# Patient Record
Sex: FEMALE | Race: WHITE | NOT HISPANIC OR LATINO | URBAN - METROPOLITAN AREA
[De-identification: names, ages, dates, MRNs, and addresses within clinical notes are randomized per-mention and may not be internally consistent; named-entity substitution may affect disease eponyms.]

---

## 2017-04-06 ENCOUNTER — EMERGENCY (EMERGENCY)
Facility: HOSPITAL | Age: 82
LOS: 1 days | Discharge: HOME | End: 2017-04-06
Attending: EMERGENCY MEDICINE
Payer: COMMERCIAL

## 2017-04-06 VITALS
DIASTOLIC BLOOD PRESSURE: 80 MMHG | HEART RATE: 70 BPM | RESPIRATION RATE: 20 BRPM | OXYGEN SATURATION: 100 % | SYSTOLIC BLOOD PRESSURE: 141 MMHG | TEMPERATURE: 99 F

## 2017-04-06 VITALS — HEIGHT: 62 IN | WEIGHT: 138.01 LBS

## 2017-04-06 DIAGNOSIS — M79.1 MYALGIA: ICD-10-CM

## 2017-04-06 DIAGNOSIS — Z98.49 CATARACT EXTRACTION STATUS, UNSPECIFIED EYE: Chronic | ICD-10-CM

## 2017-04-06 DIAGNOSIS — S62.102A FRACTURE OF UNSPECIFIED CARPAL BONE, LEFT WRIST, INITIAL ENCOUNTER FOR CLOSED FRACTURE: Chronic | ICD-10-CM

## 2017-04-06 DIAGNOSIS — Z98.89 OTHER SPECIFIED POSTPROCEDURAL STATES: Chronic | ICD-10-CM

## 2017-04-06 DIAGNOSIS — M54.5 LOW BACK PAIN: ICD-10-CM

## 2017-04-06 LAB
ALBUMIN SERPL ELPH-MCNC: 3.6 G/DL — SIGNIFICANT CHANGE UP (ref 3.5–5)
ALP SERPL-CCNC: 59 U/L — SIGNIFICANT CHANGE UP (ref 40–120)
ALT FLD-CCNC: 18 U/L DA — SIGNIFICANT CHANGE UP (ref 10–60)
ANION GAP SERPL CALC-SCNC: 12 MMOL/L — SIGNIFICANT CHANGE UP (ref 5–17)
AST SERPL-CCNC: 20 U/L — SIGNIFICANT CHANGE UP (ref 10–40)
BILIRUB SERPL-MCNC: 0.3 MG/DL — SIGNIFICANT CHANGE UP (ref 0.2–1.2)
BUN SERPL-MCNC: 19 MG/DL — HIGH (ref 7–18)
CALCIUM SERPL-MCNC: 8.5 MG/DL — SIGNIFICANT CHANGE UP (ref 8.4–10.5)
CHLORIDE SERPL-SCNC: 102 MMOL/L — SIGNIFICANT CHANGE UP (ref 96–108)
CO2 SERPL-SCNC: 26 MMOL/L — SIGNIFICANT CHANGE UP (ref 22–31)
CREAT SERPL-MCNC: 0.9 MG/DL — SIGNIFICANT CHANGE UP (ref 0.5–1.3)
GLUCOSE SERPL-MCNC: 75 MG/DL — SIGNIFICANT CHANGE UP (ref 70–99)
HCT VFR BLD CALC: 37.2 % — SIGNIFICANT CHANGE UP (ref 34.5–45)
HGB BLD-MCNC: 12.4 G/DL — SIGNIFICANT CHANGE UP (ref 11.5–15.5)
LIDOCAIN IGE QN: 174 U/L — SIGNIFICANT CHANGE UP (ref 73–393)
MCHC RBC-ENTMCNC: 30.6 PG — SIGNIFICANT CHANGE UP (ref 27–34)
MCHC RBC-ENTMCNC: 33.4 GM/DL — SIGNIFICANT CHANGE UP (ref 32–36)
MCV RBC AUTO: 91.6 FL — SIGNIFICANT CHANGE UP (ref 80–100)
PLATELET # BLD AUTO: 211 K/UL — SIGNIFICANT CHANGE UP (ref 150–400)
POTASSIUM SERPL-MCNC: 3.7 MMOL/L — SIGNIFICANT CHANGE UP (ref 3.5–5.3)
POTASSIUM SERPL-SCNC: 3.7 MMOL/L — SIGNIFICANT CHANGE UP (ref 3.5–5.3)
PROT SERPL-MCNC: 7.1 G/DL — SIGNIFICANT CHANGE UP (ref 6–8.3)
RBC # BLD: 4.06 M/UL — SIGNIFICANT CHANGE UP (ref 3.8–5.2)
RBC # FLD: 13.1 % — SIGNIFICANT CHANGE UP (ref 10.3–14.5)
SODIUM SERPL-SCNC: 140 MMOL/L — SIGNIFICANT CHANGE UP (ref 135–145)
TROPONIN I SERPL-MCNC: <0.015 NG/ML — SIGNIFICANT CHANGE UP (ref 0–0.04)
WBC # BLD: 9 K/UL — SIGNIFICANT CHANGE UP (ref 3.8–10.5)
WBC # FLD AUTO: 9 K/UL — SIGNIFICANT CHANGE UP (ref 3.8–10.5)

## 2017-04-06 PROCEDURE — 93005 ELECTROCARDIOGRAM TRACING: CPT

## 2017-04-06 PROCEDURE — 85027 COMPLETE CBC AUTOMATED: CPT

## 2017-04-06 PROCEDURE — 96374 THER/PROPH/DIAG INJ IV PUSH: CPT

## 2017-04-06 PROCEDURE — 99284 EMERGENCY DEPT VISIT MOD MDM: CPT

## 2017-04-06 PROCEDURE — 71046 X-RAY EXAM CHEST 2 VIEWS: CPT

## 2017-04-06 PROCEDURE — 99284 EMERGENCY DEPT VISIT MOD MDM: CPT | Mod: 25

## 2017-04-06 PROCEDURE — 71020: CPT | Mod: 26

## 2017-04-06 PROCEDURE — 84484 ASSAY OF TROPONIN QUANT: CPT

## 2017-04-06 PROCEDURE — 80053 COMPREHEN METABOLIC PANEL: CPT

## 2017-04-06 PROCEDURE — 83690 ASSAY OF LIPASE: CPT

## 2017-04-06 RX ORDER — KETOROLAC TROMETHAMINE 30 MG/ML
15 SYRINGE (ML) INJECTION ONCE
Qty: 0 | Refills: 0 | Status: DISCONTINUED | OUTPATIENT
Start: 2017-04-06 | End: 2017-04-06

## 2017-04-06 RX ADMIN — Medication 15 MILLIGRAM(S): at 21:52

## 2017-04-06 RX ADMIN — Medication 15 MILLIGRAM(S): at 18:20

## 2017-04-06 NOTE — ED PROVIDER NOTE - CONDUCTED A DETAILED DISCUSSION WITH PATIENT AND/OR GUARDIAN REGARDING, MDM
need for outpatient follow-up/return to ED if symptoms worsen, persist or questions arise need for outpatient follow-up/lab results/radiology results/return to ED if symptoms worsen, persist or questions arise

## 2017-04-06 NOTE — ED PROVIDER NOTE - PMH
AAA (abdominal aortic aneurysm)  stable  Breast CA    Chronic obstructive pulmonary disease    Hyperlipidemia    Hypertension    Nicotine dependence    Osteoporosis    Seasonal allergies    TB (tuberculosis)  as a child

## 2017-04-06 NOTE — ED ADULT NURSE NOTE - OBJECTIVE STATEMENT
stated she had back pain yesterday radiating to l side of thorax area. denies any pain at this time. ekg done. made comfortable.

## 2017-04-06 NOTE — ED PROVIDER NOTE - PSH
H/O left breast biopsy  6/2014  History of cataract extraction  bilateral  Left wrist fracture  s/p ORIF dec 2013  Port-a-cath in place  [place] 8/4/2014

## 2017-04-06 NOTE — ED PROVIDER NOTE - MEDICAL DECISION MAKING DETAILS
Plan to check blood work, get CXR, repeat cardiogram. If normal, given Hx, will advise admission for stress test and cardiac monitoring. Pt with h/o aneurysm, smoker, here with 2 days of bilateral low back pain after physical exertion. Normal exam. labs reassuring. Ecg reassuring. CXR wnl. Pt feels better after analgesia. Wishes to be dc'ed and plans to follow up with pmd. Daughter accompanies pt. Printed copy of results. Dc with outpt follow up.

## 2017-04-06 NOTE — ED PROVIDER NOTE - NS ED MD SCRIBE ATTENDING SCRIBE SECTIONS
HISTORY OF PRESENT ILLNESS/VITAL SIGNS( Pullset)/DISPOSITION/PAST MEDICAL/SURGICAL/SOCIAL HISTORY/HIV/REVIEW OF SYSTEMS/PHYSICAL EXAM

## 2017-04-06 NOTE — ED PROVIDER NOTE - OBJECTIVE STATEMENT
80 y/o female with PMHx of Aortic Aneurysm (size of 6cmx3.1cm), HTN, and HLD presents to the ED c/o lower back pain x yesterday. Pt notes that she took ASA to mild relief in Sx. Pt is a current everyday smoker. Pt states that she had stress test done > year ago. Pt denies abd pain, trouble breathing, nausea, or any other complaints. Allergies: Lisinopril (Angioedema). PMD: Dr. Shayy Jones. 80 y/o female with PMHx of Aortic Aneurysm (size of 6cmx3.1cm), HTN, and HLD presents to the ED c/o lower back pain, bilateral x yesterday. She reports moving furniture around the day before onset of sx. Pt notes that she took ASA to mild relief in Sx. Pt is a current everyday smoker. Pt denies abd pain, trouble breathing, nausea, or any other complaints. Allergies: Lisinopril (Angioedema). PMD: Dr. Shayy Jones.

## 2017-04-10 DIAGNOSIS — Z86.11 PERSONAL HISTORY OF TUBERCULOSIS: ICD-10-CM

## 2017-04-10 DIAGNOSIS — F17.210 NICOTINE DEPENDENCE, CIGARETTES, UNCOMPLICATED: ICD-10-CM

## 2017-04-10 DIAGNOSIS — J44.9 CHRONIC OBSTRUCTIVE PULMONARY DISEASE, UNSPECIFIED: ICD-10-CM

## 2017-04-10 DIAGNOSIS — Z85.3 PERSONAL HISTORY OF MALIGNANT NEOPLASM OF BREAST: ICD-10-CM

## 2017-04-10 DIAGNOSIS — E78.5 HYPERLIPIDEMIA, UNSPECIFIED: ICD-10-CM

## 2017-04-10 DIAGNOSIS — I10 ESSENTIAL (PRIMARY) HYPERTENSION: ICD-10-CM

## 2017-04-10 DIAGNOSIS — M81.0 AGE-RELATED OSTEOPOROSIS WITHOUT CURRENT PATHOLOGICAL FRACTURE: ICD-10-CM

## 2018-02-02 ENCOUNTER — INPATIENT (INPATIENT)
Facility: HOSPITAL | Age: 83
LOS: 0 days | Discharge: HOME | DRG: 313 | End: 2018-02-03
Attending: HOSPITALIST | Admitting: HOSPITALIST
Payer: COMMERCIAL

## 2018-02-02 VITALS
SYSTOLIC BLOOD PRESSURE: 190 MMHG | OXYGEN SATURATION: 98 % | DIASTOLIC BLOOD PRESSURE: 80 MMHG | RESPIRATION RATE: 18 BRPM | HEIGHT: 62 IN | TEMPERATURE: 98 F | HEART RATE: 81 BPM | WEIGHT: 130.07 LBS

## 2018-02-02 DIAGNOSIS — S62.102A FRACTURE OF UNSPECIFIED CARPAL BONE, LEFT WRIST, INITIAL ENCOUNTER FOR CLOSED FRACTURE: Chronic | ICD-10-CM

## 2018-02-02 DIAGNOSIS — F17.200 NICOTINE DEPENDENCE, UNSPECIFIED, UNCOMPLICATED: ICD-10-CM

## 2018-02-02 DIAGNOSIS — R07.9 CHEST PAIN, UNSPECIFIED: ICD-10-CM

## 2018-02-02 DIAGNOSIS — Z98.89 OTHER SPECIFIED POSTPROCEDURAL STATES: Chronic | ICD-10-CM

## 2018-02-02 DIAGNOSIS — J44.9 CHRONIC OBSTRUCTIVE PULMONARY DISEASE, UNSPECIFIED: ICD-10-CM

## 2018-02-02 DIAGNOSIS — Z29.9 ENCOUNTER FOR PROPHYLACTIC MEASURES, UNSPECIFIED: ICD-10-CM

## 2018-02-02 DIAGNOSIS — Z98.49 CATARACT EXTRACTION STATUS, UNSPECIFIED EYE: Chronic | ICD-10-CM

## 2018-02-02 DIAGNOSIS — I10 ESSENTIAL (PRIMARY) HYPERTENSION: ICD-10-CM

## 2018-02-02 DIAGNOSIS — C50.919 MALIGNANT NEOPLASM OF UNSPECIFIED SITE OF UNSPECIFIED FEMALE BREAST: ICD-10-CM

## 2018-02-02 LAB
ALBUMIN SERPL ELPH-MCNC: 3.8 G/DL — SIGNIFICANT CHANGE UP (ref 3.5–5)
ALP SERPL-CCNC: 61 U/L — SIGNIFICANT CHANGE UP (ref 40–120)
ALT FLD-CCNC: 23 U/L DA — SIGNIFICANT CHANGE UP (ref 10–60)
ANION GAP SERPL CALC-SCNC: 9 MMOL/L — SIGNIFICANT CHANGE UP (ref 5–17)
APTT BLD: 34.3 SEC — SIGNIFICANT CHANGE UP (ref 27.5–37.4)
AST SERPL-CCNC: 22 U/L — SIGNIFICANT CHANGE UP (ref 10–40)
BASOPHILS # BLD AUTO: 0.1 K/UL — SIGNIFICANT CHANGE UP (ref 0–0.2)
BASOPHILS NFR BLD AUTO: 0.5 % — SIGNIFICANT CHANGE UP (ref 0–2)
BILIRUB SERPL-MCNC: 0.4 MG/DL — SIGNIFICANT CHANGE UP (ref 0.2–1.2)
BUN SERPL-MCNC: 17 MG/DL — SIGNIFICANT CHANGE UP (ref 7–18)
CALCIUM SERPL-MCNC: 9.2 MG/DL — SIGNIFICANT CHANGE UP (ref 8.4–10.5)
CHLORIDE SERPL-SCNC: 104 MMOL/L — SIGNIFICANT CHANGE UP (ref 96–108)
CK MB BLD-MCNC: 1.4 % — SIGNIFICANT CHANGE UP (ref 0–3.5)
CK MB CFR SERPL CALC: 1.7 NG/ML — SIGNIFICANT CHANGE UP (ref 0–3.6)
CK SERPL-CCNC: 118 U/L — SIGNIFICANT CHANGE UP (ref 21–215)
CO2 SERPL-SCNC: 28 MMOL/L — SIGNIFICANT CHANGE UP (ref 22–31)
CREAT SERPL-MCNC: 0.84 MG/DL — SIGNIFICANT CHANGE UP (ref 0.5–1.3)
EOSINOPHIL # BLD AUTO: 0 K/UL — SIGNIFICANT CHANGE UP (ref 0–0.5)
EOSINOPHIL NFR BLD AUTO: 0.3 % — SIGNIFICANT CHANGE UP (ref 0–6)
GLUCOSE SERPL-MCNC: 106 MG/DL — HIGH (ref 70–99)
HCT VFR BLD CALC: 40 % — SIGNIFICANT CHANGE UP (ref 34.5–45)
HGB BLD-MCNC: 12.9 G/DL — SIGNIFICANT CHANGE UP (ref 11.5–15.5)
INR BLD: 0.98 RATIO — SIGNIFICANT CHANGE UP (ref 0.88–1.16)
LYMPHOCYTES # BLD AUTO: 1.9 K/UL — SIGNIFICANT CHANGE UP (ref 1–3.3)
LYMPHOCYTES # BLD AUTO: 18.8 % — SIGNIFICANT CHANGE UP (ref 13–44)
MCHC RBC-ENTMCNC: 30.9 PG — SIGNIFICANT CHANGE UP (ref 27–34)
MCHC RBC-ENTMCNC: 32.3 GM/DL — SIGNIFICANT CHANGE UP (ref 32–36)
MCV RBC AUTO: 95.8 FL — SIGNIFICANT CHANGE UP (ref 80–100)
MONOCYTES # BLD AUTO: 0.4 K/UL — SIGNIFICANT CHANGE UP (ref 0–0.9)
MONOCYTES NFR BLD AUTO: 4.3 % — SIGNIFICANT CHANGE UP (ref 2–14)
NEUTROPHILS # BLD AUTO: 7.9 K/UL — HIGH (ref 1.8–7.4)
NEUTROPHILS NFR BLD AUTO: 76 % — SIGNIFICANT CHANGE UP (ref 43–77)
PLATELET # BLD AUTO: 220 K/UL — SIGNIFICANT CHANGE UP (ref 150–400)
POTASSIUM SERPL-MCNC: 4.5 MMOL/L — SIGNIFICANT CHANGE UP (ref 3.5–5.3)
POTASSIUM SERPL-SCNC: 4.5 MMOL/L — SIGNIFICANT CHANGE UP (ref 3.5–5.3)
PROT SERPL-MCNC: 7.5 G/DL — SIGNIFICANT CHANGE UP (ref 6–8.3)
PROTHROM AB SERPL-ACNC: 10.7 SEC — SIGNIFICANT CHANGE UP (ref 9.8–12.7)
RBC # BLD: 4.18 M/UL — SIGNIFICANT CHANGE UP (ref 3.8–5.2)
RBC # FLD: 14 % — SIGNIFICANT CHANGE UP (ref 10.3–14.5)
SODIUM SERPL-SCNC: 141 MMOL/L — SIGNIFICANT CHANGE UP (ref 135–145)
TROPONIN I SERPL-MCNC: <0.015 NG/ML — SIGNIFICANT CHANGE UP (ref 0–0.04)
TROPONIN I SERPL-MCNC: <0.015 NG/ML — SIGNIFICANT CHANGE UP (ref 0–0.04)
WBC # BLD: 10.3 K/UL — SIGNIFICANT CHANGE UP (ref 3.8–10.5)
WBC # FLD AUTO: 10.3 K/UL — SIGNIFICANT CHANGE UP (ref 3.8–10.5)

## 2018-02-02 PROCEDURE — 99223 1ST HOSP IP/OBS HIGH 75: CPT | Mod: GC

## 2018-02-02 PROCEDURE — 71045 X-RAY EXAM CHEST 1 VIEW: CPT | Mod: 26

## 2018-02-02 PROCEDURE — 71275 CT ANGIOGRAPHY CHEST: CPT | Mod: 26

## 2018-02-02 PROCEDURE — 99285 EMERGENCY DEPT VISIT HI MDM: CPT | Mod: 25

## 2018-02-02 RX ORDER — NICOTINE POLACRILEX 2 MG
1 GUM BUCCAL DAILY
Qty: 0 | Refills: 0 | Status: DISCONTINUED | OUTPATIENT
Start: 2018-02-02 | End: 2018-02-03

## 2018-02-02 RX ORDER — ATORVASTATIN CALCIUM 80 MG/1
40 TABLET, FILM COATED ORAL AT BEDTIME
Qty: 0 | Refills: 0 | Status: DISCONTINUED | OUTPATIENT
Start: 2018-02-02 | End: 2018-02-03

## 2018-02-02 RX ORDER — ASPIRIN/CALCIUM CARB/MAGNESIUM 324 MG
1 TABLET ORAL
Qty: 0 | Refills: 0 | COMMUNITY

## 2018-02-02 RX ORDER — FERROUS SULFATE 325(65) MG
1 TABLET ORAL
Qty: 0 | Refills: 0 | COMMUNITY

## 2018-02-02 RX ORDER — ENOXAPARIN SODIUM 100 MG/ML
40 INJECTION SUBCUTANEOUS DAILY
Qty: 0 | Refills: 0 | Status: DISCONTINUED | OUTPATIENT
Start: 2018-02-02 | End: 2018-02-03

## 2018-02-02 RX ORDER — ASPIRIN/CALCIUM CARB/MAGNESIUM 324 MG
81 TABLET ORAL DAILY
Qty: 0 | Refills: 0 | Status: DISCONTINUED | OUTPATIENT
Start: 2018-02-02 | End: 2018-02-03

## 2018-02-02 RX ORDER — ASPIRIN/CALCIUM CARB/MAGNESIUM 324 MG
325 TABLET ORAL ONCE
Qty: 0 | Refills: 0 | Status: DISCONTINUED | OUTPATIENT
Start: 2018-02-02 | End: 2018-02-02

## 2018-02-02 RX ORDER — SODIUM CHLORIDE 9 MG/ML
3 INJECTION INTRAMUSCULAR; INTRAVENOUS; SUBCUTANEOUS ONCE
Qty: 0 | Refills: 0 | Status: COMPLETED | OUTPATIENT
Start: 2018-02-02 | End: 2018-02-02

## 2018-02-02 RX ORDER — UBIDECARENONE 100 MG
200 CAPSULE ORAL
Qty: 0 | Refills: 0 | COMMUNITY

## 2018-02-02 RX ORDER — SIMVASTATIN 20 MG/1
1 TABLET, FILM COATED ORAL
Qty: 0 | Refills: 0 | COMMUNITY

## 2018-02-02 RX ORDER — NEBIVOLOL HYDROCHLORIDE 5 MG/1
1 TABLET ORAL
Qty: 0 | Refills: 0 | COMMUNITY

## 2018-02-02 RX ORDER — CHOLECALCIFEROL (VITAMIN D3) 125 MCG
1 CAPSULE ORAL
Qty: 0 | Refills: 0 | COMMUNITY

## 2018-02-02 RX ORDER — NEBIVOLOL HYDROCHLORIDE 5 MG/1
5 TABLET ORAL DAILY
Qty: 0 | Refills: 0 | Status: DISCONTINUED | OUTPATIENT
Start: 2018-02-02 | End: 2018-02-03

## 2018-02-02 RX ORDER — HYDROCHLOROTHIAZIDE 25 MG
12.5 TABLET ORAL DAILY
Qty: 0 | Refills: 0 | Status: DISCONTINUED | OUTPATIENT
Start: 2018-02-02 | End: 2018-02-03

## 2018-02-02 RX ADMIN — Medication 12.5 MILLIGRAM(S): at 16:11

## 2018-02-02 RX ADMIN — ATORVASTATIN CALCIUM 40 MILLIGRAM(S): 80 TABLET, FILM COATED ORAL at 22:07

## 2018-02-02 RX ADMIN — NEBIVOLOL HYDROCHLORIDE 5 MILLIGRAM(S): 5 TABLET ORAL at 16:12

## 2018-02-02 RX ADMIN — SODIUM CHLORIDE 3 MILLILITER(S): 9 INJECTION INTRAMUSCULAR; INTRAVENOUS; SUBCUTANEOUS at 08:37

## 2018-02-02 NOTE — ED PROVIDER NOTE - MEDICAL DECISION MAKING DETAILS
Pt w/ above hx of chest pain now resolved. Will obtain cardiac workup vs eval for PE. Otherwise well appearing. Will give ASA and reassess. Pt w/ above hx of chest pain now resolved. Will obtain cardiac workup vs eval for PE. Otherwise well appearing. work up reviewed, chest pain free, will admit for further ACS eval Pt w/ above hx of chest pain now resolved. Will obtain cardiac workup vs eval for PE (given breast cancer hx, worse w inspiration). Otherwise well appearing. work up reviewed, chest pain free, will admit for further ACS eval

## 2018-02-02 NOTE — H&P ADULT - PROBLEM SELECTOR PLAN 1
will admit the patient for chest pain   given the risk factors { active smoker , age >65 , HTN , hld }  BARON score -3-4   will trend cardiac enzymes { t1 -negative }  admit to telemetry   c/w aspirin , statin and b-blocker  follow with echo and will need { stress test as intermediate risk } baron score of 3 will admit the patient for chest pain   given the risk factors { active smoker , age >65 , HTN , hyperlipidemia  BARON score -3-4   will trend cardiac enzymes { t1 -negative }  admit to telemetry   c/w aspirin , statin and b-blocker  follow with echo and will need { stress test as intermediate risk } baron score of 3

## 2018-02-02 NOTE — H&P ADULT - ASSESSMENT
80 y/o female , lives with daughter , independent at baseline  with PMHx of Aortic Aneurysm (size of 6cmx3.1cm), HTN,copd { never used inhaler and not on home o2 and never intubated } and HLD presents to the ED c/o chest pain , sharp , moderate in intensity and lasted for 45 min and aggravated with deep breathing and she never experienced similar episode before and non -radiating .    will admit the patient for chest pain   given the risk factors { active smoker , age >65 , HTN , hld }  BARON score -3-4   will trend cardiac enzymes   admit to telemetry   c/w aspirin , statin and b-blocker .    elevated b.p  -190/80  as patient did not take her home blood pressure medications today

## 2018-02-02 NOTE — H&P ADULT - HISTORY OF PRESENT ILLNESS
82 y/o female , lives with daughter , independent at baseline  with PMHx of Aortic Aneurysm (size of 6cmx3.1cm), HTN, and HLD presents to the ED c/o chest pain , sharp , moderate in intensity and lasted for 45 min and aggravated with deep breathing and she never experienced similar episode before and non -radiating . patient denies any  palpitations, shortness of breath with exertion ,leg swelling , nausea , vomiting , abdominal  pain or urinary urgency , dysuria or any other acute complaints . patient has mastectomy {left side done 3 years ago for ductal carcinoma breast } and follows with serial mammograms every year and ultrasound screening every 6 months for abdominal aneurysm and as per patient no increase in diameter .      IN the E.D patient vitals are b.p  190/80, hr-81 ,temp -98 and rr-18  and spo2- 98  and EKG -NSR  .

## 2018-02-02 NOTE — H&P ADULT - NSHPLABSRESULTS_GEN_ALL_CORE
T(C): 36.7 (02 Feb 2018 06:59), Max: 36.7 (02 Feb 2018 06:59)  T(F): 98 (02 Feb 2018 06:59), Max: 98 (02 Feb 2018 06:59)  HR: 81 (02 Feb 2018 06:59) (81 - 81)  BP: 190/80 (02 Feb 2018 06:59) (190/80 - 190/80)  BP(mean): --  RR: 18 (02 Feb 2018 06:59) (18 - 18)  SpO2: 98% (02 Feb 2018 06:59) (98% - 98%)        LABS:                        12.9   10.3  )-----------( 220      ( 02 Feb 2018 08:38 )             40.0     02-02    141  |  104  |  17  ----------------------------<  106<H>  4.5   |  28  |  0.84    Ca    9.2      02 Feb 2018 08:38    TPro  7.5  /  Alb  3.8  /  TBili  0.4  /  DBili  x   /  AST  22  /  ALT  23  /  AlkPhos  61  02-02    PT/INR - ( 02 Feb 2018 08:38 )   PT: 10.7 sec;   INR: 0.98 ratio         PTT - ( 02 Feb 2018 08:38 )  PTT:34.3 sec    CAPILLARY BLOOD GLUCOSE          RADIOLOGY & ADDITIONAL TESTS:    Imaging Personally Reviewed: CTA :No pulmonary embolism.    Emphysema with unchanged left lower lobe subpleural 3 mm nodule since   2015.    Biapical opacity is unchanged from the prior study and may represent   scarring.

## 2018-02-02 NOTE — H&P ADULT - PROBLEM SELECTOR PLAN 4
-c/w duonebs q 6 hours   patient takes no inhaler at home   smoking cessation counselling provided and will need PFTS  as out-patient

## 2018-02-02 NOTE — H&P ADULT - NSHPPHYSICALEXAM_GEN_ALL_CORE
PHYSICAL EXAM:  GENERAL: NAD, well-groomed, well-developed  HEAD:  Atraumatic, Normocephalic  EYES: EOMI, PERRLA, conjunctiva and sclera clear  NECK: Supple, No JVD, Normal thyroid  CHEST/LUNG: Clear to percussion bilaterally; No rales, rhonchi, wheezing, or rubs  HEART: Regular rate and rhythm; No murmurs, rubs, or gallops  ABDOMEN: Soft, Nontender, Nondistended; Bowel sounds present  NERVOUS SYSTEM:  Alert & Oriented X3, Good concentration; Motor Strength 5/5 B/L   EXTREMITIES:  2+ Peripheral Pulses, No clubbing, cyanosis, or edema  SKIN; intact

## 2018-02-02 NOTE — ED PROVIDER NOTE - OBJECTIVE STATEMENT
83 y/o F pt w/ hx of AAA, breast CA, HLD, HTN w/ chest pain since 6Am this morning, Pain is sharp  non radiating and lasted for 1 hour. Pt is s/p L mastectomy. Denies leg selling. prior cardiac hx, nc NKDA. 83 y/o F pt w/ hx of AAA, breast CA, HLD, HTN w/ chest pain since 6Am this morning, Pain is sharp  non radiating and lasted for 1 hour. Pt is s/p L mastectomy. Denies leg selling. prior cardiac hx, nc NKDA. Pain left side of chest, no assoc diaphoresis, nausea. stated worsened when took a deep breath.

## 2018-02-02 NOTE — H&P ADULT - FAMILY HISTORY
Father  Still living? Unknown  Family history of diabetes mellitus (DM), Age at diagnosis: Age Unknown  Family history of heart disease, Age at diagnosis: Age Unknown     Mother  Still living? Unknown  Family history of diabetes mellitus (DM), Age at diagnosis: Age Unknown  Family history of heart disease, Age at diagnosis: Age Unknown     Sibling  Still living? Unknown  Family history of diabetes mellitus (DM), Age at diagnosis: Age Unknown  Family history of heart disease, Age at diagnosis: Age Unknown

## 2018-02-02 NOTE — H&P ADULT - ATTENDING COMMENTS
Patient was seen and examined by myself. Case was discussed with house staff in details. I have reviewed and agree with the plan as outlined above with edits where appropriate.  Atypical chest pain and uncontrolled HTN  - monitor on telemetry  - serial cardiac enzymes  - BP control and close monitoring  Other plan as above

## 2018-02-02 NOTE — H&P ADULT - PROBLEM SELECTOR PLAN 2
-will c/w hydrochlorothiazide 12.5 mg , bystolic  5mg   dash diet   monitor b.p  closely -will c/w hydrochlorothiazide 12.5 mg , Bystolic  5mg   dash diet   monitor b.p  closely

## 2018-02-02 NOTE — H&P ADULT - PROBLEM SELECTOR PLAN 5
-c/w nicotine patch 14 mg daily   -patient understands the risk after counselling and is not ready to enroll in smoking cessation program at the moment

## 2018-02-03 ENCOUNTER — TRANSCRIPTION ENCOUNTER (OUTPATIENT)
Age: 83
End: 2018-02-03

## 2018-02-03 VITALS
RESPIRATION RATE: 17 BRPM | SYSTOLIC BLOOD PRESSURE: 148 MMHG | OXYGEN SATURATION: 100 % | TEMPERATURE: 99 F | DIASTOLIC BLOOD PRESSURE: 86 MMHG | HEART RATE: 64 BPM

## 2018-02-03 LAB
CK MB BLD-MCNC: 1.3 % — SIGNIFICANT CHANGE UP (ref 0–3.5)
CK MB CFR SERPL CALC: 1.6 NG/ML — SIGNIFICANT CHANGE UP (ref 0–3.6)
CK SERPL-CCNC: 126 U/L — SIGNIFICANT CHANGE UP (ref 21–215)
TROPONIN I SERPL-MCNC: <0.015 NG/ML — SIGNIFICANT CHANGE UP (ref 0–0.04)

## 2018-02-03 PROCEDURE — 71045 X-RAY EXAM CHEST 1 VIEW: CPT

## 2018-02-03 PROCEDURE — 99239 HOSP IP/OBS DSCHRG MGMT >30: CPT

## 2018-02-03 PROCEDURE — 84484 ASSAY OF TROPONIN QUANT: CPT

## 2018-02-03 PROCEDURE — 85730 THROMBOPLASTIN TIME PARTIAL: CPT

## 2018-02-03 PROCEDURE — 82553 CREATINE MB FRACTION: CPT

## 2018-02-03 PROCEDURE — 82550 ASSAY OF CK (CPK): CPT

## 2018-02-03 PROCEDURE — 85027 COMPLETE CBC AUTOMATED: CPT

## 2018-02-03 PROCEDURE — 99285 EMERGENCY DEPT VISIT HI MDM: CPT | Mod: 25

## 2018-02-03 PROCEDURE — 85610 PROTHROMBIN TIME: CPT

## 2018-02-03 PROCEDURE — 85379 FIBRIN DEGRADATION QUANT: CPT

## 2018-02-03 PROCEDURE — 80053 COMPREHEN METABOLIC PANEL: CPT

## 2018-02-03 PROCEDURE — 71275 CT ANGIOGRAPHY CHEST: CPT

## 2018-02-03 PROCEDURE — 93005 ELECTROCARDIOGRAM TRACING: CPT

## 2018-02-03 RX ADMIN — Medication 81 MILLIGRAM(S): at 11:00

## 2018-02-03 RX ADMIN — Medication 12.5 MILLIGRAM(S): at 05:33

## 2018-02-03 RX ADMIN — NEBIVOLOL HYDROCHLORIDE 5 MILLIGRAM(S): 5 TABLET ORAL at 05:33

## 2018-02-03 NOTE — PROGRESS NOTE ADULT - ATTENDING COMMENTS
DIPO- stable for discharge home with plan for outpatient stress test  OUTPATIENT PCP follow up in 1 week

## 2018-02-03 NOTE — DISCHARGE NOTE ADULT - HOSPITAL COURSE
HPI: 82 y/o female , lives with daughter , independent at baseline  with PMHx of Aortic Aneurysm (size of 6cmx3.1cm), HTN, and HLD presents to the ED c/o chest pain , sharp , moderate in intensity and lasted for 45 min and aggravated with deep breathing and she never experienced similar episode before and non -radiating . patient denies any  palpitations, shortness of breath with exertion ,leg swelling , nausea , vomiting , abdominal  pain or urinary urgency , dysuria or any other acute complaints . patient has mastectomy {left side done 3 years ago for ductal carcinoma breast } and follows with serial mammograms every year and ultrasound screening every 6 months for abdominal aneurysm and as per patient no increase in diameter .    Pt was admitted for Chest pain given the risk factors { active smoker , age >65 , HTN , hyperlipidemia. BARON score was 3-4. Cardiac enzymes were negative x 3. Pt stayed asymptomatic on tele for less than 24 hours. Keep taking aspirin , statin and b-blocker @ home dose. Echo was not performed as patient wanted to go home.     Pt also has Hypertension.  Keep taking hydrochlorothiazide 12.5 mg , Bystolic  5mg. Pt also has Breast CA. S/p chemo-therapy and mastectomy. For COPD keep taking duonebs as needed. Smoking cessation counselling provided and will need PFTS  as out-patient. PT has Tobacco dependence.  Keep taking Nicotine patch 14 mg daily. Patient understands the risk after counselling and is not ready to enroll in smoking cessation program at the moment.    Pt is stable for discharge as per Dr Garcia HPI: 81 y/o female , lives with daughter , independent at baseline  with PMHx of Aortic Aneurysm (size of 6cmx3.1cm), HTN, and HLD presents to the ED c/o chest pain , sharp , moderate in intensity and lasted for 45 min and aggravated with deep breathing and she never experienced similar episode before and non -radiating . patient denies any  palpitations, shortness of breath with exertion ,leg swelling , nausea , vomiting , abdominal  pain or urinary urgency , dysuria or any other acute complaints . patient has mastectomy {left side done 3 years ago for ductal carcinoma breast } and follows with serial mammograms every year and ultrasound screening every 6 months for abdominal aneurysm and as per patient no increase in diameter .    Pt was admitted for Chest pain given the risk factors { active smoker , age >65 , HTN , hyperlipidemia. BARON score was 3-4. Cardiac enzymes were negative x 3. Pt stayed asymptomatic on tele for less than 24 hours. Keep taking aspirin , statin and b-blocker @ home dose. Echo was not performed as patient wanted to go home.     Pt also has Hypertension.  Keep taking hydrochlorothiazide 12.5 mg , Bystolic  5mg. Pt also has Breast CA. S/p chemo-therapy and mastectomy. For COPD keep taking duonebs as needed. Smoking cessation counselling provided and will need PFTS  as out-patient. PT has Tobacco dependence.  Keep taking Nicotine patch 14 mg daily. Patient understands the risk after counselling and is not ready to enroll in smoking cessation program at the moment.    Pt is stable for discharge as per Dr Garcia

## 2018-02-03 NOTE — PROGRESS NOTE ADULT - SUBJECTIVE AND OBJECTIVE BOX
MEDICAL ATTENDING NOTE    Patient is a 82y old  Female who presents with a chief complaint of CHEST PAIN (02 Feb 2018 14:55)      INTERVAL HPI/OVERNIGHT EVENTS: no new complaints    MEDICATIONS  (STANDING):  aspirin  chewable 81 milliGRAM(s) Oral daily  atorvastatin 40 milliGRAM(s) Oral at bedtime  enoxaparin Injectable 40 milliGRAM(s) SubCutaneous daily  hydrochlorothiazide 12.5 milliGRAM(s) Oral daily  nebivolol 5 milliGRAM(s) Oral daily  nicotine -  14 mG/24Hr(s) Patch 1 patch Transdermal daily    MEDICATIONS  (PRN):      __________________________________________________  ----------------------------------------------------------------------------------  REVIEW OF SYSTEMS: no fever, no SOB, No Chest pain; feels well      Vital Signs Last 24 Hrs  T(C): 36.8 (03 Feb 2018 05:20), Max: 37.1 (02 Feb 2018 15:55)  T(F): 98.3 (03 Feb 2018 05:20), Max: 98.7 (02 Feb 2018 15:55)  HR: 59 (03 Feb 2018 05:20) (59 - 62)  BP: 133/58 (03 Feb 2018 05:20) (133/58 - 161/62)  BP(mean): --  RR: 17 (03 Feb 2018 05:20) (15 - 18)  SpO2: 97% (03 Feb 2018 05:20) (95% - 99%)    _________________  PHYSICAL EXAM:  ---------------------------   NAD; Normocephalic;   LUNGS - no wheezing  HEART: S1 S2+   ABDOMEN: Soft, Nontender, non distended  EXTREMITIES: no cyanosis; no edema  NERVOUS SYSTEM:  Awake and alert; no new deficits    _________________________________________________  LABS:                        12.9   10.3  )-----------( 220      ( 02 Feb 2018 08:38 )             40.0     02-02    141  |  104  |  17  ----------------------------<  106<H>  4.5   |  28  |  0.84    Ca    9.2      02 Feb 2018 08:38    TPro  7.5  /  Alb  3.8  /  TBili  0.4  /  DBili  x   /  AST  22  /  ALT  23  /  AlkPhos  61  02-02    PT/INR - ( 02 Feb 2018 08:38 )   PT: 10.7 sec;   INR: 0.98 ratio         PTT - ( 02 Feb 2018 08:38 )  PTT:34.3 sec    CAPILLARY BLOOD GLUCOSE                Care Discussed with Consultants :     Plan of care was discussed with patient ; all questions and concerns were addressed and care was aligned with patient's wishes.  Patient has been advised to follow up with PMD upon discharge from the hospital.  Discharge plans discussed with nursing staff , case manger and .

## 2018-02-03 NOTE — DISCHARGE NOTE ADULT - MEDICATION SUMMARY - MEDICATIONS TO TAKE
I will START or STAY ON the medications listed below when I get home from the hospital:    aspirin 81 mg oral tablet  -- 1 tab(s) by mouth once a day  -- Indication: For ACS    simvastatin 20 mg oral tablet  -- 1 tab(s) by mouth once a day (at bedtime)  -- Indication: For HLD    simvastatin  --  by mouth   -- Indication: For HLD    Bystolic 5 mg oral tablet  -- 1 tab(s) by mouth once a day  -- Indication: For HTN    hydrochlorothiazide 12.5 mg oral capsule  -- 1 cap(s) by mouth once a day  -- Avoid prolonged or excessive exposure to direct and/or artificial sunlight while taking this medication.  It is very important that you take or use this exactly as directed.  Do not skip doses or discontinue unless directed by your doctor.  It may be advisable to drink a full glass orange juice or eat a banana daily while taking this medication.  Take with food or milk.    -- Indication: For HTN    ferrous sulfate  --  by mouth   -- Indication: For Anemia    ferrous sulfate 325 mg (65 mg elemental iron) oral delayed release tablet  -- 1 tab(s) by mouth once a day  -- Indication: For Anemia    CoQ10  -- 200 milligram(s) by mouth once a day  -- Indication: For supplement    Vitamin D3 2000 intl units oral capsule  -- 1 cap(s) by mouth once a day  -- Indication: For supplement

## 2018-02-03 NOTE — PROGRESS NOTE ADULT - ASSESSMENT
80 y/o female , lives with daughter , independent at baseline  with PMHx of Aortic Aneurysm (size of 6cmx3.1cm), HTN,copd { never used inhaler and not on home o2 and never intubated } and HLD presents to the ED c/o chest pain , sharp , moderate in intensity and lasted for 45 min and aggravated with deep breathing and she never experienced similar episode before and non -radiating .

## 2018-02-03 NOTE — DISCHARGE NOTE ADULT - PLAN OF CARE
Prevent shortness of breath and chest pain Pt stayed asymptomatic on tele for less than 24 hours. Keep taking aspirin , statin and b-blocker @ home dose. Please get the Echocardiogram after discharge with PMD Keep Bp 140/90 Keep taking hydrochlorothiazide 12.5 mg , Bystolic  5mg. Continue with mammogram and screening with PMD Pt stayed asymptomatic on tele for less than 24 hours. Keep taking aspirin , statin and b-blocker @ home dose. Please get the Echocardiogram after discharge with PMD. plan for outpatient stress test  OUTPATIENT PCP follow up in 1 week.

## 2018-02-03 NOTE — DISCHARGE NOTE ADULT - CARE PLAN
Principal Discharge DX:	Chest pain  Goal:	Prevent shortness of breath and chest pain  Assessment and plan of treatment:	Pt stayed asymptomatic on tele for less than 24 hours. Keep taking aspirin , statin and b-blocker @ home dose. Please get the Echocardiogram after discharge with PMD  Secondary Diagnosis:	Hypertension  Goal:	Keep Bp 140/90  Assessment and plan of treatment:	Keep taking hydrochlorothiazide 12.5 mg , Bystolic  5mg.  Secondary Diagnosis:	Breast CA  Assessment and plan of treatment:	Continue with mammogram and screening with PMD Principal Discharge DX:	Chest pain  Goal:	Prevent shortness of breath and chest pain  Assessment and plan of treatment:	Pt stayed asymptomatic on tele for less than 24 hours. Keep taking aspirin , statin and b-blocker @ home dose. Please get the Echocardiogram after discharge with PMD. plan for outpatient stress test  OUTPATIENT PCP follow up in 1 week.  Secondary Diagnosis:	Hypertension  Goal:	Keep Bp 140/90  Assessment and plan of treatment:	Keep taking hydrochlorothiazide 12.5 mg , Bystolic  5mg.  Secondary Diagnosis:	Breast CA  Assessment and plan of treatment:	Continue with mammogram and screening with PMD

## 2018-02-03 NOTE — DISCHARGE NOTE ADULT - PATIENT PORTAL LINK FT
You can access the [facility], offered by [facility], by registering with the following website: [email or web address]

## 2018-02-03 NOTE — DISCHARGE NOTE ADULT - CARE PROVIDER_API CALL
Patricio Santos), Cardiovascular Disease; Internal Medicine  [address] [address] [place] [address]  Phone: [phone]  Fax: [phone] Patricio Santos), Cardiovascular Disease; Internal Medicine  [address] [address] [place] [address]  Phone: [phone]  Fax: [phone]    Shayy Jones), Internal Medicine  [address] [place] [address]  Phone: [phone]  Fax: [phone]

## 2019-06-29 ENCOUNTER — EMERGENCY (EMERGENCY)
Facility: HOSPITAL | Age: 84
LOS: 1 days | Discharge: HOME | End: 2019-06-29
Attending: EMERGENCY MEDICINE
Payer: COMMERCIAL

## 2019-06-29 VITALS
SYSTOLIC BLOOD PRESSURE: 149 MMHG | HEIGHT: 62 IN | TEMPERATURE: 98 F | WEIGHT: 128.09 LBS | OXYGEN SATURATION: 99 % | DIASTOLIC BLOOD PRESSURE: 82 MMHG | HEART RATE: 74 BPM | RESPIRATION RATE: 20 BRPM

## 2019-06-29 DIAGNOSIS — Z98.89 OTHER SPECIFIED POSTPROCEDURAL STATES: Chronic | ICD-10-CM

## 2019-06-29 DIAGNOSIS — Z98.49 CATARACT EXTRACTION STATUS, UNSPECIFIED EYE: Chronic | ICD-10-CM

## 2019-06-29 DIAGNOSIS — S62.102A FRACTURE OF UNSPECIFIED CARPAL BONE, LEFT WRIST, INITIAL ENCOUNTER FOR CLOSED FRACTURE: Chronic | ICD-10-CM

## 2019-06-29 LAB
ALBUMIN SERPL ELPH-MCNC: 4 G/DL — SIGNIFICANT CHANGE UP (ref 3.5–5)
ALP SERPL-CCNC: 64 U/L — SIGNIFICANT CHANGE UP (ref 40–120)
ALT FLD-CCNC: 30 U/L DA — SIGNIFICANT CHANGE UP (ref 10–60)
ANION GAP SERPL CALC-SCNC: 10 MMOL/L — SIGNIFICANT CHANGE UP (ref 5–17)
APPEARANCE UR: CLEAR — SIGNIFICANT CHANGE UP
AST SERPL-CCNC: 44 U/L — HIGH (ref 10–40)
BASOPHILS # BLD AUTO: 0.03 K/UL — SIGNIFICANT CHANGE UP (ref 0–0.2)
BASOPHILS NFR BLD AUTO: 0.3 % — SIGNIFICANT CHANGE UP (ref 0–2)
BILIRUB SERPL-MCNC: 0.5 MG/DL — SIGNIFICANT CHANGE UP (ref 0.2–1.2)
BILIRUB UR-MCNC: NEGATIVE — SIGNIFICANT CHANGE UP
BUN SERPL-MCNC: 14 MG/DL — SIGNIFICANT CHANGE UP (ref 7–18)
CALCIUM SERPL-MCNC: 8.7 MG/DL — SIGNIFICANT CHANGE UP (ref 8.4–10.5)
CHLORIDE SERPL-SCNC: 100 MMOL/L — SIGNIFICANT CHANGE UP (ref 96–108)
CK MB CFR SERPL CALC: 1.8 NG/ML — SIGNIFICANT CHANGE UP (ref 0–3.6)
CO2 SERPL-SCNC: 24 MMOL/L — SIGNIFICANT CHANGE UP (ref 22–31)
COLOR SPEC: YELLOW — SIGNIFICANT CHANGE UP
CREAT SERPL-MCNC: 0.98 MG/DL — SIGNIFICANT CHANGE UP (ref 0.5–1.3)
DIFF PNL FLD: ABNORMAL
EOSINOPHIL # BLD AUTO: 0.04 K/UL — SIGNIFICANT CHANGE UP (ref 0–0.5)
EOSINOPHIL NFR BLD AUTO: 0.4 % — SIGNIFICANT CHANGE UP (ref 0–6)
GLUCOSE SERPL-MCNC: 134 MG/DL — HIGH (ref 70–99)
GLUCOSE UR QL: NEGATIVE — SIGNIFICANT CHANGE UP
HCT VFR BLD CALC: 38.8 % — SIGNIFICANT CHANGE UP (ref 34.5–45)
HGB BLD-MCNC: 13 G/DL — SIGNIFICANT CHANGE UP (ref 11.5–15.5)
IMM GRANULOCYTES NFR BLD AUTO: 0.3 % — SIGNIFICANT CHANGE UP (ref 0–1.5)
KETONES UR-MCNC: NEGATIVE — SIGNIFICANT CHANGE UP
LEUKOCYTE ESTERASE UR-ACNC: NEGATIVE — SIGNIFICANT CHANGE UP
LYMPHOCYTES # BLD AUTO: 1.35 K/UL — SIGNIFICANT CHANGE UP (ref 1–3.3)
LYMPHOCYTES # BLD AUTO: 13.2 % — SIGNIFICANT CHANGE UP (ref 13–44)
MAGNESIUM SERPL-MCNC: 1.8 MG/DL — SIGNIFICANT CHANGE UP (ref 1.6–2.6)
MCHC RBC-ENTMCNC: 30.2 PG — SIGNIFICANT CHANGE UP (ref 27–34)
MCHC RBC-ENTMCNC: 33.5 GM/DL — SIGNIFICANT CHANGE UP (ref 32–36)
MCV RBC AUTO: 90.2 FL — SIGNIFICANT CHANGE UP (ref 80–100)
MONOCYTES # BLD AUTO: 0.56 K/UL — SIGNIFICANT CHANGE UP (ref 0–0.9)
MONOCYTES NFR BLD AUTO: 5.5 % — SIGNIFICANT CHANGE UP (ref 2–14)
NEUTROPHILS # BLD AUTO: 8.2 K/UL — HIGH (ref 1.8–7.4)
NEUTROPHILS NFR BLD AUTO: 80.3 % — HIGH (ref 43–77)
NITRITE UR-MCNC: NEGATIVE — SIGNIFICANT CHANGE UP
NRBC # BLD: 0 /100 WBCS — SIGNIFICANT CHANGE UP (ref 0–0)
PH UR: 7 — SIGNIFICANT CHANGE UP (ref 5–8)
PLATELET # BLD AUTO: 256 K/UL — SIGNIFICANT CHANGE UP (ref 150–400)
POTASSIUM SERPL-MCNC: 4.4 MMOL/L — SIGNIFICANT CHANGE UP (ref 3.5–5.3)
POTASSIUM SERPL-SCNC: 4.4 MMOL/L — SIGNIFICANT CHANGE UP (ref 3.5–5.3)
PROT SERPL-MCNC: 7.5 G/DL — SIGNIFICANT CHANGE UP (ref 6–8.3)
PROT UR-MCNC: 30 MG/DL
RBC # BLD: 4.3 M/UL — SIGNIFICANT CHANGE UP (ref 3.8–5.2)
RBC # FLD: 14.2 % — SIGNIFICANT CHANGE UP (ref 10.3–14.5)
SODIUM SERPL-SCNC: 134 MMOL/L — LOW (ref 135–145)
SP GR SPEC: 1.01 — SIGNIFICANT CHANGE UP (ref 1.01–1.02)
TROPONIN I SERPL-MCNC: <0.015 NG/ML — SIGNIFICANT CHANGE UP (ref 0–0.04)
UROBILINOGEN FLD QL: NEGATIVE — SIGNIFICANT CHANGE UP
WBC # BLD: 10.21 K/UL — SIGNIFICANT CHANGE UP (ref 3.8–10.5)
WBC # FLD AUTO: 10.21 K/UL — SIGNIFICANT CHANGE UP (ref 3.8–10.5)

## 2019-06-29 PROCEDURE — 99284 EMERGENCY DEPT VISIT MOD MDM: CPT | Mod: 25

## 2019-06-29 PROCEDURE — 73700 CT LOWER EXTREMITY W/O DYE: CPT

## 2019-06-29 PROCEDURE — 70450 CT HEAD/BRAIN W/O DYE: CPT | Mod: 26

## 2019-06-29 PROCEDURE — 80053 COMPREHEN METABOLIC PANEL: CPT

## 2019-06-29 PROCEDURE — 73700 CT LOWER EXTREMITY W/O DYE: CPT | Mod: 26,RT

## 2019-06-29 PROCEDURE — 85027 COMPLETE CBC AUTOMATED: CPT

## 2019-06-29 PROCEDURE — 83735 ASSAY OF MAGNESIUM: CPT

## 2019-06-29 PROCEDURE — 70450 CT HEAD/BRAIN W/O DYE: CPT

## 2019-06-29 PROCEDURE — 81001 URINALYSIS AUTO W/SCOPE: CPT

## 2019-06-29 PROCEDURE — 84484 ASSAY OF TROPONIN QUANT: CPT

## 2019-06-29 PROCEDURE — 82962 GLUCOSE BLOOD TEST: CPT

## 2019-06-29 PROCEDURE — 36415 COLL VENOUS BLD VENIPUNCTURE: CPT

## 2019-06-29 PROCEDURE — 99284 EMERGENCY DEPT VISIT MOD MDM: CPT

## 2019-06-29 PROCEDURE — 93005 ELECTROCARDIOGRAM TRACING: CPT

## 2019-06-29 PROCEDURE — 82553 CREATINE MB FRACTION: CPT

## 2019-06-29 NOTE — ED ADULT NURSE NOTE - CHPI ED NUR SYMPTOMS NEG
no weakness/no tingling/no deformity/no fever/no abrasion/no bruising/no back pain/no numbness/no stiffness

## 2019-06-29 NOTE — ED PROVIDER NOTE - CARE PROVIDER_API CALL
Abdi Zamudio)  Neurology  Epilepsy  [address] [facility], [address]  [place] [address]  Phone: [phone]  Follow Up Time:

## 2019-06-29 NOTE — ED ADULT NURSE NOTE - NSIMPLEMENTINTERV_GEN_ALL_ED
Implemented All Fall with Harm Risk Interventions:  [place] to call system. Call bell, personal items and telephone within reach. Instruct patient to call for assistance. Room bathroom lighting operational. Non-slip footwear when patient is off stretcher. Physically safe environment: no spills, clutter or unnecessary equipment. Stretcher in lowest position, wheels locked, appropriate side rails in place. Provide visual cue, wrist band, yellow gown, etc. Monitor gait and stability. Monitor for mental status changes and reorient to person, place, and time. Review medications for side effects contributing to fall risk. Reinforce activity limits and safety measures with patient and family. Provide visual clues: red socks.

## 2019-06-29 NOTE — ED PROVIDER NOTE - CLINICAL SUMMARY MEDICAL DECISION MAKING FREE TEXT BOX
numbness of the left arm. likely peripheral neuropathy or radiculopathy rather than rarer pure sensory stroke. will need to rule out tib plat fx. numbness of the left arm. likely peripheral neuropathy or radiculopathy rather than rarer pure sensory stroke. will need to rule out tib plat fx. As interpreted by ED physician, ECG is NSR with normal intervals/axis, no changes in QRS, no ST/T changes. numbness of the left arm. likely peripheral neuropathy or radiculopathy rather than rarer pure sensory stroke. will need to rule out tib plat fx. As interpreted by ED physician, ECG is NSR with normal intervals/axis, no changes in QRS, no ST/T changes. ct head wnl. ct knee wnl. ok for dc home, will need to follow up with pmd.

## 2019-06-29 NOTE — ED PROVIDER NOTE - PHYSICAL EXAMINATION
Gen: Alert, NAD  Head: NC, AT   Eyes: PERRL, EOMI, normal lids/conjunctiva  ENT: normal hearing, patent oropharynx without erythema/exudate, uvula midline  Neck: supple, no tenderness, Trachea midline  Pulm: Bilateral BS, normal resp effort, no wheeze/stridor/retractions  CV: RRR, no M/R/G, 2+ radial and dp pulses bl, no edema  Abd: soft, NT/ND, +BS, no hepatosplenomegaly  Mskel: pain with axial load of the left knee. no ctl spine ttp.   Skin: no rash, no bruising   Neuro: AAOx3, no motor deficits, CN 2-12 intact. subjective sensory loss left upper ext

## 2019-06-29 NOTE — ED ADULT TRIAGE NOTE - CHIEF COMPLAINT QUOTE
Last week fell landed on knees and hand, right knee unable to bare weight. Now with tingling to left arm

## 2019-06-29 NOTE — ED PROVIDER NOTE - OBJECTIVE STATEMENT
Pertinent PMH/PSH/FHx/SHx and Review of Systems contained within:  83F hx of htn pw 2 complaints. 1 - left upper ext numbness x2 days. feels the numbness from the elbow into hands without weakness. 2 - right knee pain sp fall 1 week ago. hard to put weight on the right knee. there is no cp, sob, nausea, vomiting, fever, chills, slurred speech, weakness, rash, bleeding, dysuria, vision loss, epistaxis. she did not take anything for symptoms  Fh and Sh not otherwise contributory  ROS otherwise negative

## 2021-01-29 ENCOUNTER — EMERGENCY (EMERGENCY)
Facility: HOSPITAL | Age: 86
LOS: 1 days | Discharge: HOME | End: 2021-01-29
Attending: EMERGENCY MEDICINE
Payer: COMMERCIAL

## 2021-01-29 VITALS
HEART RATE: 75 BPM | RESPIRATION RATE: 18 BRPM | OXYGEN SATURATION: 97 % | TEMPERATURE: 98 F | SYSTOLIC BLOOD PRESSURE: 159 MMHG | DIASTOLIC BLOOD PRESSURE: 81 MMHG

## 2021-01-29 VITALS
RESPIRATION RATE: 16 BRPM | HEIGHT: 62 IN | DIASTOLIC BLOOD PRESSURE: 75 MMHG | HEART RATE: 89 BPM | WEIGHT: 119.05 LBS | SYSTOLIC BLOOD PRESSURE: 145 MMHG | OXYGEN SATURATION: 97 % | TEMPERATURE: 98 F

## 2021-01-29 DIAGNOSIS — Z98.49 CATARACT EXTRACTION STATUS, UNSPECIFIED EYE: Chronic | ICD-10-CM

## 2021-01-29 DIAGNOSIS — Z98.89 OTHER SPECIFIED POSTPROCEDURAL STATES: Chronic | ICD-10-CM

## 2021-01-29 DIAGNOSIS — S62.102A FRACTURE OF UNSPECIFIED CARPAL BONE, LEFT WRIST, INITIAL ENCOUNTER FOR CLOSED FRACTURE: Chronic | ICD-10-CM

## 2021-01-29 LAB
ALBUMIN SERPL ELPH-MCNC: 4.1 G/DL — SIGNIFICANT CHANGE UP (ref 3.5–5)
ALP SERPL-CCNC: 70 U/L — SIGNIFICANT CHANGE UP (ref 40–120)
ALT FLD-CCNC: 26 U/L DA — SIGNIFICANT CHANGE UP (ref 10–60)
ANION GAP SERPL CALC-SCNC: 8 MMOL/L — SIGNIFICANT CHANGE UP (ref 5–17)
AST SERPL-CCNC: 27 U/L — SIGNIFICANT CHANGE UP (ref 10–40)
BASOPHILS # BLD AUTO: 0.02 K/UL — SIGNIFICANT CHANGE UP (ref 0–0.2)
BASOPHILS NFR BLD AUTO: 0.2 % — SIGNIFICANT CHANGE UP (ref 0–2)
BILIRUB SERPL-MCNC: 0.4 MG/DL — SIGNIFICANT CHANGE UP (ref 0.2–1.2)
BUN SERPL-MCNC: 14 MG/DL — SIGNIFICANT CHANGE UP (ref 7–18)
CALCIUM SERPL-MCNC: 9.1 MG/DL — SIGNIFICANT CHANGE UP (ref 8.4–10.5)
CHLORIDE SERPL-SCNC: 104 MMOL/L — SIGNIFICANT CHANGE UP (ref 96–108)
CO2 SERPL-SCNC: 26 MMOL/L — SIGNIFICANT CHANGE UP (ref 22–31)
CREAT SERPL-MCNC: 1.01 MG/DL — SIGNIFICANT CHANGE UP (ref 0.5–1.3)
EOSINOPHIL # BLD AUTO: 0.02 K/UL — SIGNIFICANT CHANGE UP (ref 0–0.5)
EOSINOPHIL NFR BLD AUTO: 0.2 % — SIGNIFICANT CHANGE UP (ref 0–6)
GLUCOSE SERPL-MCNC: 120 MG/DL — HIGH (ref 70–99)
HCT VFR BLD CALC: 37.9 % — SIGNIFICANT CHANGE UP (ref 34.5–45)
HGB BLD-MCNC: 12.2 G/DL — SIGNIFICANT CHANGE UP (ref 11.5–15.5)
IMM GRANULOCYTES NFR BLD AUTO: 0.5 % — SIGNIFICANT CHANGE UP (ref 0–1.5)
INR BLD: 1.02 RATIO — SIGNIFICANT CHANGE UP (ref 0.88–1.16)
LYMPHOCYTES # BLD AUTO: 1.35 K/UL — SIGNIFICANT CHANGE UP (ref 1–3.3)
LYMPHOCYTES # BLD AUTO: 16.1 % — SIGNIFICANT CHANGE UP (ref 13–44)
MCHC RBC-ENTMCNC: 30 PG — SIGNIFICANT CHANGE UP (ref 27–34)
MCHC RBC-ENTMCNC: 32.2 GM/DL — SIGNIFICANT CHANGE UP (ref 32–36)
MCV RBC AUTO: 93.1 FL — SIGNIFICANT CHANGE UP (ref 80–100)
MONOCYTES # BLD AUTO: 0.59 K/UL — SIGNIFICANT CHANGE UP (ref 0–0.9)
MONOCYTES NFR BLD AUTO: 7 % — SIGNIFICANT CHANGE UP (ref 2–14)
NEUTROPHILS # BLD AUTO: 6.36 K/UL — SIGNIFICANT CHANGE UP (ref 1.8–7.4)
NEUTROPHILS NFR BLD AUTO: 76 % — SIGNIFICANT CHANGE UP (ref 43–77)
NRBC # BLD: 0 /100 WBCS — SIGNIFICANT CHANGE UP (ref 0–0)
NT-PROBNP SERPL-SCNC: 366 PG/ML — SIGNIFICANT CHANGE UP (ref 0–450)
PLATELET # BLD AUTO: 224 K/UL — SIGNIFICANT CHANGE UP (ref 150–400)
POTASSIUM SERPL-MCNC: 3.6 MMOL/L — SIGNIFICANT CHANGE UP (ref 3.5–5.3)
POTASSIUM SERPL-SCNC: 3.6 MMOL/L — SIGNIFICANT CHANGE UP (ref 3.5–5.3)
PROT SERPL-MCNC: 7.5 G/DL — SIGNIFICANT CHANGE UP (ref 6–8.3)
PROTHROM AB SERPL-ACNC: 12.1 SEC — SIGNIFICANT CHANGE UP (ref 10.6–13.6)
RBC # BLD: 4.07 M/UL — SIGNIFICANT CHANGE UP (ref 3.8–5.2)
RBC # FLD: 14.2 % — SIGNIFICANT CHANGE UP (ref 10.3–14.5)
SODIUM SERPL-SCNC: 138 MMOL/L — SIGNIFICANT CHANGE UP (ref 135–145)
TROPONIN I SERPL-MCNC: <0.015 NG/ML — SIGNIFICANT CHANGE UP (ref 0–0.04)
WBC # BLD: 8.38 K/UL — SIGNIFICANT CHANGE UP (ref 3.8–10.5)
WBC # FLD AUTO: 8.38 K/UL — SIGNIFICANT CHANGE UP (ref 3.8–10.5)

## 2021-01-29 PROCEDURE — 84484 ASSAY OF TROPONIN QUANT: CPT

## 2021-01-29 PROCEDURE — 85025 COMPLETE CBC W/AUTO DIFF WBC: CPT

## 2021-01-29 PROCEDURE — 80053 COMPREHEN METABOLIC PANEL: CPT

## 2021-01-29 PROCEDURE — 99284 EMERGENCY DEPT VISIT MOD MDM: CPT

## 2021-01-29 PROCEDURE — 83880 ASSAY OF NATRIURETIC PEPTIDE: CPT

## 2021-01-29 PROCEDURE — 99284 EMERGENCY DEPT VISIT MOD MDM: CPT | Mod: 25

## 2021-01-29 PROCEDURE — 71045 X-RAY EXAM CHEST 1 VIEW: CPT | Mod: 26

## 2021-01-29 PROCEDURE — 36415 COLL VENOUS BLD VENIPUNCTURE: CPT

## 2021-01-29 PROCEDURE — 93005 ELECTROCARDIOGRAM TRACING: CPT

## 2021-01-29 PROCEDURE — 85610 PROTHROMBIN TIME: CPT

## 2021-01-29 PROCEDURE — 71045 X-RAY EXAM CHEST 1 VIEW: CPT

## 2021-01-29 RX ORDER — SODIUM CHLORIDE 9 MG/ML
3 INJECTION INTRAMUSCULAR; INTRAVENOUS; SUBCUTANEOUS EVERY 8 HOURS
Refills: 0 | Status: DISCONTINUED | OUTPATIENT
Start: 2021-01-29 | End: 2021-02-01

## 2021-01-29 RX ORDER — IBUPROFEN 200 MG
600 TABLET ORAL ONCE
Refills: 0 | Status: COMPLETED | OUTPATIENT
Start: 2021-01-29 | End: 2021-01-29

## 2021-01-29 RX ADMIN — Medication 600 MILLIGRAM(S): at 08:25

## 2021-01-29 RX ADMIN — Medication 30 MILLILITER(S): at 08:05

## 2021-01-29 NOTE — ED PROVIDER NOTE - PROGRESS NOTE DETAILS
reynoso: pending cbc. trop neg. cxr no acute findings. improve with maalox and motrin.  dx chest pain. follow up with your primary MD and/or cardiologist if persist. can take maalox for abd gas, tylenol/motrin for chest wall pain. return if worsens.

## 2021-01-29 NOTE — ED PROVIDER NOTE - NSPTACCESSSVCSAPPTDETAILS_ED_ALL_ED_FT
follow up with your primary MD and/or cardiologist if persist. can take maalox for abd gas, tylenol/motrin for chest wall pain. return if worsens

## 2021-01-29 NOTE — ED PROVIDER NOTE - CLINICAL SUMMARY MEDICAL DECISION MAKING FREE TEXT BOX
85 yr old female with hx of HTN, HLD, breast ca, COPD, AAA presents to ed c/o left localized sharp intermitted chest pain since last night while sleeping. no diaphoresis, no n/v/d, no abd pain, no cough, no fever, no chills, no syncope, no sick contact.  pt still smoking and admits being very bloated. baseline chronic sob unchanged.    chest pain possibly msk vs reflux r/o acs vs PTX although very low based on h&p. labs, cxr, ekg, maalox and motrin, re-assess. if work up neg can be dc with outpt follow up

## 2021-01-29 NOTE — ED PROVIDER NOTE - NSFOLLOWUPINSTRUCTIONS_ED_ALL_ED_FT
Chest Pain    WHAT YOU NEED TO KNOW:    Chest pain can be caused by a range of conditions, from not serious to life-threatening. Chest pain can be a symptom of a digestive problem, such as acid reflux or a stomach ulcer. An anxiety attack or a strong emotion, such as anger, can also cause chest pain. Infection, inflammation, or a fracture in the bones or cartilage in your chest can cause pain or discomfort. Sometimes chest pain or pressure is caused by poor blood flow to your heart (angina). Chest pain may also be caused by life-threatening conditions such as a heart attack or blood clot in your lungs.    DISCHARGE INSTRUCTIONS:    Call your local emergency number (911 in the ) or have someone call if:   •You have any of the following signs of a heart attack: ?Squeezing, pressure, or pain in your chest      ?You may also have any of the following: ?Discomfort or pain in your back, neck, jaw, stomach, or arm      ?Shortness of breath      ?Nausea or vomiting      ?Lightheadedness or a sudden cold sweat            Seek care immediately if:   •You have chest discomfort that gets worse, even with medicine.      •You cough or vomit blood.      •Your bowel movements are black or bloody.      •You cannot stop vomiting, or it hurts to swallow.      Call your doctor if:   •You have questions or concerns about your condition or care.          Medicines:   •Medicines may be given to treat the cause of your chest pain. Examples include pain medicine, anxiety medicine, or medicines to increase blood flow to your heart.      •Do not take certain medicines without asking your healthcare provider first. These include NSAIDs, herbal or vitamin supplements, or hormones (estrogen or progestin).      •Take your medicine as directed. Contact your healthcare provider if you think your medicine is not helping or if you have side effects. Tell him or her if you are allergic to any medicine. Keep a list of the medicines, vitamins, and herbs you take. Include the amounts, and when and why you take them. Bring the list or the pill bottles to follow-up visits. Carry your medicine list with you in case of an emergency.      Healthy living tips: The following are general healthy guidelines. If the cause of your chest pain is known, your healthcare provider will give you specific guidelines to follow.  •Do not smoke. Nicotine and other chemicals in cigarettes and cigars can cause lung and heart damage. Ask your healthcare provider for information if you currently smoke and need help to quit. E-cigarettes or smokeless tobacco still contain nicotine. Talk to your healthcare provider before you use these products.      •Choose a variety of healthy foods as often as possible. Include fresh, frozen, or canned fruits and vegetables. Also include low-fat dairy products, fish, chicken (without skin), and lean meats. Your healthcare provider or a dietitian can help you create meal plans. You may need to avoid certain foods or drinks if your pain is caused by a digestion problem.  Healthy Foods           •Lower your sodium (salt) intake. Limit foods that are high in sodium, such as canned foods, salty snacks, and cold cuts. If you add salt when you cook food, do not add more at the table. Choose low-sodium canned foods as much as possible.             •Drink plenty of water every day. Water helps your body to control temperature and blood pressure. Ask your healthcare provider how much water you should drink every day.      •Ask about activity. Your healthcare provider will tell you which activities to limit or avoid. Ask when you can drive, return to work, and have sex. Ask about the best exercise plan for you.      •Maintain a healthy weight. Ask your healthcare provider what a healthy weight is for you. Ask him or her to help you create a safe weight loss plan if you are overweight.      •Ask about vaccines you may need. Get the influenza (flu) vaccine every year as soon as recommended, usually in September or October. You may also need a pneumococcal vaccine to prevent pneumonia. The vaccine is usually given every 5 years, starting at age 65. Your healthcare provider can tell you if should get other vaccines, and when to get them.      Follow up with your healthcare provider within 72 hours, or as directed: You may need to return for more tests to find the cause of your chest pain. You may be referred to a specialist, such as a cardiologist or gastroenterologist. Write down your questions so you remember to ask them during your visits.      Gastritis    WHAT YOU NEED TO KNOW:    Gastritis is inflammation or irritation of the lining of your stomach.     Abdominal Organs         DISCHARGE INSTRUCTIONS:    Call 911 for any of the following:   •You develop chest pain or shortness of breath.          Return to the emergency department if:   •You vomit blood.      •You have black or bloody bowel movements.      •You have severe stomach or back pain.      Contact your healthcare provider if:   •You have a fever.      •You have new or worsening symptoms, even after treatment.      •You have questions or concerns about your condition or care.      Medicines:   •Medicines may be given to help treat a bacterial infection or decrease stomach acid.       •Take your medicine as directed. Contact your healthcare provider if you think your medicine is not helping or if you have side effects. Tell him or her if you are allergic to any medicine. Keep a list of the medicines, vitamins, and herbs you take. Include the amounts, and when and why you take them. Bring the list or the pill bottles to follow-up visits. Carry your medicine list with you in case of an emergency.      Manage or prevent gastritis:   •Do not smoke. Nicotine and other chemicals in cigarettes and cigars can make your symptoms worse and cause lung damage. Ask your healthcare provider for information if you currently smoke and need help to quit. E-cigarettes or smokeless tobacco still contain nicotine. Talk to your healthcare provider before you use these products.       •Do not drink alcohol. Alcohol can prevent healing and make your gastritis worse. Talk to your healthcare provider if you need help to stop drinking.      •Do not take NSAIDs or aspirin unless directed. These and similar medicines can cause irritation. If your healthcare provider says it is okay to take NSAIDs, take them with food.       •Do not eat foods that cause irritation. Foods such as oranges and salsa can cause burning or pain. Eat a variety of healthy foods. Examples include fruits (not citrus), vegetables, low-fat dairy products, beans, whole-grain breads, and lean meats and fish. Try to eat small meals, and drink water with your meals. Do not eat for at least 3 hours before you go to bed.      •Find ways to relax and decrease stress. Stress can increase stomach acid and make gastritis worse. Activities such as yoga, meditation, or listening to music can help you relax. Spend time with friends, or do things you enjoy.      Follow up with your healthcare provider as directed: You may need ongoing tests or treatment, or referral to a gastroenterologist. Write down your questions so you remember to ask them during your visits.

## 2021-01-29 NOTE — ED ADULT NURSE NOTE - NS ED NURSE LEVEL OF CONSCIOUSNESS AFFECT
Transferred from L/D by bed with an IV of LR plain  1000 ml with @ 640 ml to count at 125 ml/ hr, with a side IV of  ml = 30 units pitocin with @ 227 ml to count at 93 ml/hr. Infant taken to the [facility]. Oriented to the room, post op teaching started. Assessment done, lungs are clear. Abdomen is soft , no bowel sounds , no gas. Abdominal dressing is dry and intact. Perineal care done, no vaginal bleeding noted. Abdominal binder is on. Perineal care done [place] cath care done. SCD on. Menu given. Pain rated at \"6\", pain goal is \"7\".  Debnies need at thsi time Appropriate

## 2021-01-29 NOTE — ED PROVIDER NOTE - OBJECTIVE STATEMENT
85 yr old female with hx of HTN, HLD, breast ca, COPD, AAA presents to ed c/o left localized sharp intermitted chest pain since last night while sleeping. no diaphoresis, no n/v/d, no abd pain, no cough, no fever, no chills, no syncope, no sick contact.  pt still smoking and admits being very bloated. baseline chronic sob unchanged.

## 2021-01-29 NOTE — ED ADULT NURSE NOTE - OBJECTIVE STATEMENT
Pt states has chest pain, mild , non radiating since last night  Denies nausea or vomiting, denies cough, diaphoresis

## 2021-01-29 NOTE — ED PROVIDER NOTE - MDM ORDERS SUBMITTED SELECTION
John Archer is a 69 year old male presenting with bilateral ear ruptures.    Denies known Latex allergy or symptoms of Latex sensitivity.    Health Maintenance Summary     Topic Due On Due Status Completed On    Colorectal Cancer Screening - Colonoscopy Dec 17, 1998 Overdue     Immunization - Td/Tdap Dec 17, 1966 Overdue     Immunization-Zoster Dec 17, 2008 Overdue     Immunization - Pneumococcal Dec 17, 2013 Overdue     Medicare Wellness Visit Dec 17, 2013 Overdue     Immunization-Influenza Sep 1, 2016 Overdue           Patient is due for topics as listed above, he wishes to decline at this time .        Medications verified, no changes.  .    History   Smoking Status   • Never Smoker   Smokeless Tobacco   • Never Used        Labs/EKG/Imaging Studies

## 2021-04-09 NOTE — ED PROVIDER NOTE - CARDIAC, MLM
· IV Ativan p r n  while NPO Normal rate, regular rhythm.  Heart sounds S1, S2.  No murmurs, rubs or gallops.

## 2021-07-23 ENCOUNTER — TRANSCRIPTION ENCOUNTER (OUTPATIENT)
Age: 86
End: 2021-07-23

## 2021-07-24 ENCOUNTER — TRANSCRIPTION ENCOUNTER (OUTPATIENT)
Age: 86
End: 2021-07-24

## 2022-07-12 NOTE — ED ADULT NURSE NOTE - OBJECTIVE STATEMENT
Pt stated tripped and fell last week, by the curb, denies LOC c/o Rt knee pain with ambulation and today has Lt arm tingling from elbow down  Denies headache, dizziness, no slurred speech or unsteady gait Banner Transposition Flap Text: The defect edges were debeveled with a #15 scalpel blade.  Given the location of the defect and the proximity to free margins a Banner transposition flap was deemed most appropriate.  Using a sterile surgical marker, an appropriate flap drawn around the defect. The area thus outlined was incised deep to adipose tissue with a #15 scalpel blade.  The skin margins were undermined to an appropriate distance in all directions utilizing iris scissors.

## 2022-07-26 ENCOUNTER — NON-APPOINTMENT (OUTPATIENT)
Age: 87
End: 2022-07-26

## 2023-03-28 ENCOUNTER — EMERGENCY (EMERGENCY)
Facility: HOSPITAL | Age: 88
LOS: 1 days | Discharge: HOME | End: 2023-03-28
Attending: EMERGENCY MEDICINE
Payer: MEDICARE

## 2023-03-28 VITALS
RESPIRATION RATE: 17 BRPM | SYSTOLIC BLOOD PRESSURE: 145 MMHG | OXYGEN SATURATION: 100 % | DIASTOLIC BLOOD PRESSURE: 70 MMHG | TEMPERATURE: 98 F | HEART RATE: 62 BPM

## 2023-03-28 DIAGNOSIS — S62.102A FRACTURE OF UNSPECIFIED CARPAL BONE, LEFT WRIST, INITIAL ENCOUNTER FOR CLOSED FRACTURE: Chronic | ICD-10-CM

## 2023-03-28 DIAGNOSIS — Z98.89 OTHER SPECIFIED POSTPROCEDURAL STATES: Chronic | ICD-10-CM

## 2023-03-28 DIAGNOSIS — Z98.49 CATARACT EXTRACTION STATUS, UNSPECIFIED EYE: Chronic | ICD-10-CM

## 2023-03-28 PROCEDURE — 36415 COLL VENOUS BLD VENIPUNCTURE: CPT

## 2023-03-28 PROCEDURE — 99285 EMERGENCY DEPT VISIT HI MDM: CPT

## 2023-03-28 PROCEDURE — 83735 ASSAY OF MAGNESIUM: CPT

## 2023-03-28 PROCEDURE — 82009 KETONE BODYS QUAL: CPT

## 2023-03-28 PROCEDURE — 84484 ASSAY OF TROPONIN QUANT: CPT

## 2023-03-28 PROCEDURE — 83880 ASSAY OF NATRIURETIC PEPTIDE: CPT

## 2023-03-28 PROCEDURE — 81001 URINALYSIS AUTO W/SCOPE: CPT

## 2023-03-28 PROCEDURE — 99283 EMERGENCY DEPT VISIT LOW MDM: CPT

## 2023-03-28 PROCEDURE — 83690 ASSAY OF LIPASE: CPT

## 2023-03-28 PROCEDURE — 80053 COMPREHEN METABOLIC PANEL: CPT

## 2023-03-28 PROCEDURE — 87086 URINE CULTURE/COLONY COUNT: CPT

## 2023-03-28 PROCEDURE — 93005 ELECTROCARDIOGRAM TRACING: CPT

## 2023-03-28 PROCEDURE — 85025 COMPLETE CBC W/AUTO DIFF WBC: CPT

## 2023-03-28 PROCEDURE — 82550 ASSAY OF CK (CPK): CPT

## 2023-03-28 NOTE — ED PROVIDER NOTE - PROGRESS NOTE DETAILS
Labs/EKG explained to pt & daughter  Pt in no distress, no chest heaviness, Trop-neg, will d/c home.  Advised to f/u with PMD

## 2023-03-28 NOTE — ED ADULT TRIAGE NOTE - CHIEF COMPLAINT QUOTE
Chest heaviness since last night. + shaking gen ---- BRAD Tolentino RN: I was not involved in the care of this patient and I am only entering information to backlog for downtime. Please see paper chart for details, as the mandatory fields in the triage and disposition section may not be accurate.

## 2023-03-31 NOTE — DOWNTIME INTERRUPTION NOTE - WHICH MANUAL FORMS INITIATED?
Yunior Mckee, PGY-3, MD: I was not involved in the care of this patient and am only entering information to back log for downtime. Regarding Emergency Department care during this visit/admission, please see paper chart for isolation and medical management details, as the mandatory fields in the electronic record may be incomplete/inaccurate.

## 2023-07-05 ENCOUNTER — NON-APPOINTMENT (OUTPATIENT)
Age: 88
End: 2023-07-05

## 2023-07-05 ENCOUNTER — APPOINTMENT (OUTPATIENT)
Dept: UROLOGY | Facility: CLINIC | Age: 88
End: 2023-07-05
Payer: MEDICARE

## 2023-07-05 VITALS
DIASTOLIC BLOOD PRESSURE: 76 MMHG | SYSTOLIC BLOOD PRESSURE: 130 MMHG | BODY MASS INDEX: 20.39 KG/M2 | OXYGEN SATURATION: 98 % | HEART RATE: 66 BPM | WEIGHT: 108 LBS | TEMPERATURE: 97.3 F | HEIGHT: 61 IN

## 2023-07-05 DIAGNOSIS — R31.29 OTHER MICROSCOPIC HEMATURIA: ICD-10-CM

## 2023-07-05 DIAGNOSIS — N28.1 CYST OF KIDNEY, ACQUIRED: ICD-10-CM

## 2023-07-05 PROCEDURE — 99204 OFFICE O/P NEW MOD 45 MIN: CPT

## 2023-07-05 NOTE — ASSESSMENT
[FreeTextEntry1] : priro labs and imagign reviewed\par will observe simple renal cyst \par reviewed cysto \par will repeat ua if still rbc will do cysto

## 2023-07-05 NOTE — HISTORY OF PRESENT ILLNESS
[FreeTextEntry1] : cc blood in urine \par 86 yo fem referred for eval blood in urine \par ua 3-10 rbc , prior 0-2\par no dysuria or gross hematuria \par has longstanding urgency and rge incontinence\par long term smoker \par renal sono atul simple renal cysts \par last cysto 2011 dr dobson

## 2023-07-07 LAB
APPEARANCE: CLEAR
BACTERIA: NEGATIVE /HPF
BILIRUBIN URINE: NEGATIVE
BLOOD URINE: NEGATIVE
CAST: NORMAL /LPF
COLOR: NORMAL
EPITHELIAL CELLS: 0 /HPF
GLUCOSE QUALITATIVE U: NEGATIVE MG/DL
KETONES URINE: NEGATIVE MG/DL
LEUKOCYTE ESTERASE URINE: NEGATIVE
MICROSCOPIC-UA: NORMAL
NITRITE URINE: NEGATIVE
PH URINE: 7
PROTEIN URINE: NEGATIVE MG/DL
RED BLOOD CELLS URINE: NORMAL /HPF
REVIEW: NORMAL
SPECIFIC GRAVITY URINE: 1.01
UROBILINOGEN URINE: 0.2 MG/DL
WHITE BLOOD CELLS URINE: 4 /HPF

## 2023-09-05 NOTE — ED PROVIDER NOTE - DISCHARGE DATE
[FreeTextEntry1] : Interval HX - now using Dexcom stopped using Trulicity on her own worsening renal function and under eval for transplant and HD  Quality: Type 2 DM Severity: moderate  Duration: 1993 Onset: not feeling well and diabetes found on blood test Associated Symptoms:  (+) neuropathy. (+) nephropathy, CKD with proteinuria following [facility] ([facility]), on ARB (+) bilateral proliferative retinopathy on eye exam 4/2021, following w retina (Dr Osorio, ) (+) CAD s/p stent, following with cardio (Dr Tello)  MODIFYING FACTORS: low protein diet due to kidneys, eats 2 meals, brunch 11:30 am and dinner 5:30's , drinks juice daily Current DM Regimen: Humalog 30 units with meals + scale 151-200 2 units, 201-250 4 units, 251-300 6units 301-400 8, 401+ 10 (sometimes takes insulin after meals if she forgets to take before meals) Basaglar 16 units at bedtime  SMBG: Dexcom CGM downloaded and reviewed: Dexcom Average glucose: 177 % time CGM active: 93  % VERY HIGH (>250): 8 % HIGH (181-250): 33 % TARGET ():58 % LOW (54-69): 0 % VERY LOW (<54): 0  Interpretation worsening control off Trulicity -------------------------------------------------------------------------------------------------------------------------------------------------------- Hyperlipidemia - on statin           29-Jan-2021

## 2023-09-21 ENCOUNTER — NON-APPOINTMENT (OUTPATIENT)
Age: 88
End: 2023-09-21

## 2023-09-22 ENCOUNTER — EMERGENCY (EMERGENCY)
Facility: HOSPITAL | Age: 88
LOS: 1 days | Discharge: HOME | End: 2023-09-22
Attending: EMERGENCY MEDICINE
Payer: MEDICARE

## 2023-09-22 VITALS
OXYGEN SATURATION: 97 % | RESPIRATION RATE: 16 BRPM | HEIGHT: 59 IN | SYSTOLIC BLOOD PRESSURE: 143 MMHG | WEIGHT: 108.03 LBS | HEART RATE: 78 BPM | TEMPERATURE: 98 F | DIASTOLIC BLOOD PRESSURE: 83 MMHG

## 2023-09-22 VITALS
DIASTOLIC BLOOD PRESSURE: 87 MMHG | RESPIRATION RATE: 16 BRPM | OXYGEN SATURATION: 98 % | HEART RATE: 76 BPM | SYSTOLIC BLOOD PRESSURE: 141 MMHG | TEMPERATURE: 98 F

## 2023-09-22 DIAGNOSIS — Z98.89 OTHER SPECIFIED POSTPROCEDURAL STATES: Chronic | ICD-10-CM

## 2023-09-22 DIAGNOSIS — Z98.49 CATARACT EXTRACTION STATUS, UNSPECIFIED EYE: Chronic | ICD-10-CM

## 2023-09-22 DIAGNOSIS — S62.102A FRACTURE OF UNSPECIFIED CARPAL BONE, LEFT WRIST, INITIAL ENCOUNTER FOR CLOSED FRACTURE: Chronic | ICD-10-CM

## 2023-09-22 LAB
ALBUMIN SERPL ELPH-MCNC: 3.7 G/DL — SIGNIFICANT CHANGE UP (ref 3.5–5)
ALP SERPL-CCNC: 86 U/L — SIGNIFICANT CHANGE UP (ref 40–120)
ALT FLD-CCNC: 31 U/L DA — SIGNIFICANT CHANGE UP (ref 10–60)
ANION GAP SERPL CALC-SCNC: 9 MMOL/L — SIGNIFICANT CHANGE UP (ref 5–17)
AST SERPL-CCNC: 30 U/L — SIGNIFICANT CHANGE UP (ref 10–40)
BASE EXCESS BLDV CALC-SCNC: 5 MMOL/L — SIGNIFICANT CHANGE UP
BASOPHILS # BLD AUTO: 0.06 K/UL — SIGNIFICANT CHANGE UP (ref 0–0.2)
BASOPHILS NFR BLD AUTO: 0.3 % — SIGNIFICANT CHANGE UP (ref 0–2)
BILIRUB SERPL-MCNC: 0.6 MG/DL — SIGNIFICANT CHANGE UP (ref 0.2–1.2)
BLOOD GAS COMMENTS, VENOUS: SIGNIFICANT CHANGE UP
BUN SERPL-MCNC: 20 MG/DL — HIGH (ref 7–18)
CALCIUM SERPL-MCNC: 9 MG/DL — SIGNIFICANT CHANGE UP (ref 8.4–10.5)
CHLORIDE SERPL-SCNC: 97 MMOL/L — SIGNIFICANT CHANGE UP (ref 96–108)
CO2 SERPL-SCNC: 28 MMOL/L — SIGNIFICANT CHANGE UP (ref 22–31)
CREAT SERPL-MCNC: 0.91 MG/DL — SIGNIFICANT CHANGE UP (ref 0.5–1.3)
EGFR: 61 ML/MIN/1.73M2 — SIGNIFICANT CHANGE UP
EOSINOPHIL # BLD AUTO: 0.02 K/UL — SIGNIFICANT CHANGE UP (ref 0–0.5)
EOSINOPHIL NFR BLD AUTO: 0.1 % — SIGNIFICANT CHANGE UP (ref 0–6)
GLUCOSE SERPL-MCNC: 110 MG/DL — HIGH (ref 70–99)
HCO3 BLDV-SCNC: 29 MMOL/L — SIGNIFICANT CHANGE UP (ref 22–29)
HCT VFR BLD CALC: 37.6 % — SIGNIFICANT CHANGE UP (ref 34.5–45)
HGB BLD-MCNC: 12.9 G/DL — SIGNIFICANT CHANGE UP (ref 11.5–15.5)
HOROWITZ INDEX BLDV+IHG-RTO: 21 — SIGNIFICANT CHANGE UP
IMM GRANULOCYTES NFR BLD AUTO: 0.4 % — SIGNIFICANT CHANGE UP (ref 0–0.9)
LYMPHOCYTES # BLD AUTO: 11.2 % — LOW (ref 13–44)
LYMPHOCYTES # BLD AUTO: 2.38 K/UL — SIGNIFICANT CHANGE UP (ref 1–3.3)
MCHC RBC-ENTMCNC: 30.9 PG — SIGNIFICANT CHANGE UP (ref 27–34)
MCHC RBC-ENTMCNC: 34.3 GM/DL — SIGNIFICANT CHANGE UP (ref 32–36)
MCV RBC AUTO: 90 FL — SIGNIFICANT CHANGE UP (ref 80–100)
MONOCYTES # BLD AUTO: 1.13 K/UL — HIGH (ref 0–0.9)
MONOCYTES NFR BLD AUTO: 5.3 % — SIGNIFICANT CHANGE UP (ref 2–14)
NEUTROPHILS # BLD AUTO: 17.56 K/UL — HIGH (ref 1.8–7.4)
NEUTROPHILS NFR BLD AUTO: 82.7 % — HIGH (ref 43–77)
NRBC # BLD: 0 /100 WBCS — SIGNIFICANT CHANGE UP (ref 0–0)
PCO2 BLDV: 40 MMHG — SIGNIFICANT CHANGE UP (ref 39–42)
PH BLDV: 7.47 — HIGH (ref 7.32–7.43)
PLATELET # BLD AUTO: 223 K/UL — SIGNIFICANT CHANGE UP (ref 150–400)
PO2 BLDV: 32 MMHG — SIGNIFICANT CHANGE UP
POTASSIUM SERPL-MCNC: 3.9 MMOL/L — SIGNIFICANT CHANGE UP (ref 3.5–5.3)
POTASSIUM SERPL-SCNC: 3.9 MMOL/L — SIGNIFICANT CHANGE UP (ref 3.5–5.3)
PROT SERPL-MCNC: 7.4 G/DL — SIGNIFICANT CHANGE UP (ref 6–8.3)
RBC # BLD: 4.18 M/UL — SIGNIFICANT CHANGE UP (ref 3.8–5.2)
RBC # FLD: 14.5 % — SIGNIFICANT CHANGE UP (ref 10.3–14.5)
SAO2 % BLDV: 52 % — SIGNIFICANT CHANGE UP
SODIUM SERPL-SCNC: 134 MMOL/L — LOW (ref 135–145)
TROPONIN I, HIGH SENSITIVITY RESULT: 19.7 NG/L — SIGNIFICANT CHANGE UP
WBC # BLD: 21.23 K/UL — HIGH (ref 3.8–10.5)
WBC # FLD AUTO: 21.23 K/UL — HIGH (ref 3.8–10.5)

## 2023-09-22 PROCEDURE — 71046 X-RAY EXAM CHEST 2 VIEWS: CPT | Mod: 26

## 2023-09-22 PROCEDURE — 70450 CT HEAD/BRAIN W/O DYE: CPT | Mod: 26,MA

## 2023-09-22 PROCEDURE — 72125 CT NECK SPINE W/O DYE: CPT | Mod: 26,MA

## 2023-09-22 PROCEDURE — 99285 EMERGENCY DEPT VISIT HI MDM: CPT

## 2023-09-22 RX ORDER — SODIUM CHLORIDE 9 MG/ML
1000 INJECTION INTRAMUSCULAR; INTRAVENOUS; SUBCUTANEOUS ONCE
Refills: 0 | Status: COMPLETED | OUTPATIENT
Start: 2023-09-22 | End: 2023-09-22

## 2023-09-22 RX ADMIN — SODIUM CHLORIDE 1000 MILLILITER(S): 9 INJECTION INTRAMUSCULAR; INTRAVENOUS; SUBCUTANEOUS at 21:02

## 2023-09-22 NOTE — ED PROVIDER NOTE - NSICDXFAMILYHX_GEN_ALL_CORE_FT
FAMILY HISTORY:  Sibling  Still living? Unknown  Family history of diabetes mellitus (DM), Age at diagnosis: Age Unknown  Family history of heart disease, Age at diagnosis: Age Unknown

## 2023-09-22 NOTE — ED PROVIDER NOTE - NSICDXPASTMEDICALHX_GEN_ALL_CORE_FT
PAST MEDICAL HISTORY:  AAA (abdominal aortic aneurysm) stable    Breast CA     Chronic obstructive pulmonary disease     Hyperlipidemia     Hypertension     Nicotine dependence     Osteoporosis     Seasonal allergies     TB (tuberculosis) as a child      Macular degeneration

## 2023-09-22 NOTE — ED PROVIDER NOTE - NSICDXPASTSURGICALHX_GEN_ALL_CORE_FT
PAST SURGICAL HISTORY:  H/O left breast biopsy 6/2014    History of cataract extraction bilateral    Left wrist fracture s/p ORIF dec 2013    Port-a-cath in place [place] 8/4/2014

## 2023-09-22 NOTE — ED ADULT NURSE NOTE - NSFALLUNIVINTERV_ED_ALL_ED
Bed/Stretcher in lowest position, wheels locked, appropriate side rails in place/Call bell, personal items and telephone in reach/Instruct patient to call for assistance before getting out of bed/chair/stretcher/Non-slip footwear applied when patient is off stretcher/[place] to call system/Physically safe environment - no spills, clutter or unnecessary equipment/Purposeful proactive rounding/Room/bathroom lighting operational, light cord in reach

## 2023-09-22 NOTE — ED PROVIDER NOTE - CLINICAL SUMMARY MEDICAL DECISION MAKING FREE TEXT BOX
Patient with dizziness. Possibly anemia vs dehydration vs electrolytes vs arrythmia. Will get chest x-ray to make sure there is no mass. Will get head CT, labs and reassess.

## 2023-09-22 NOTE — ED PROVIDER NOTE - NSFOLLOWUPINSTRUCTIONS_ED_ALL_ED_FT
Please see your primary care MD and possibly cardiologist. you have elevated white count which needs further evaluation Please see your primary care MD and possibly cardiologist for PVC. you have elevated white count which needs further evaluation

## 2023-09-22 NOTE — ED PROVIDER NOTE - PATIENT PORTAL LINK FT
You can access the FollowMyHealth Patient Portal offered by [facility] by registering at the following website: [email or web address]. By joining [employer]’s FollowMyHealth portal, you will also be able to view your health information using other applications (apps) compatible with our system.

## 2023-09-22 NOTE — ED PROVIDER NOTE - OBJECTIVE STATEMENT
88 year old female with a PMHx of COPD, AAA, HTN, HLD, right humeral neck fracture 3 weeks ago, and macular degeneration presents to the ED with complaints of worsening on and off dizziness, off balance, and headache, and back and neck pain since this morning. Patient states she has always had it but it is not as bad today and is currently denying off balance sensation or dizziness. Also states that she admits eating adequately but is still losing weight. Denies falls, fever, chest pain, or visual changes.

## 2023-09-22 NOTE — ED ADULT NURSE NOTE - NSFALLHARMRISKINTERV_ED_ALL_ED

## 2023-09-23 LAB
APPEARANCE UR: CLEAR — SIGNIFICANT CHANGE UP
BACTERIA # UR AUTO: NEGATIVE /HPF — SIGNIFICANT CHANGE UP
BILIRUB UR-MCNC: NEGATIVE — SIGNIFICANT CHANGE UP
COLOR SPEC: YELLOW — SIGNIFICANT CHANGE UP
DIFF PNL FLD: ABNORMAL
EPI CELLS # UR: SIGNIFICANT CHANGE UP
GLUCOSE UR QL: NEGATIVE MG/DL — SIGNIFICANT CHANGE UP
KETONES UR-MCNC: ABNORMAL MG/DL
LEUKOCYTE ESTERASE UR-ACNC: NEGATIVE — SIGNIFICANT CHANGE UP
NITRITE UR-MCNC: NEGATIVE — SIGNIFICANT CHANGE UP
PH UR: 7 — SIGNIFICANT CHANGE UP (ref 5–8)
PROT UR-MCNC: NEGATIVE MG/DL — SIGNIFICANT CHANGE UP
RBC CASTS # UR COMP ASSIST: 3 /HPF — SIGNIFICANT CHANGE UP (ref 0–4)
SP GR SPEC: 1.01 — SIGNIFICANT CHANGE UP (ref 1–1.03)
UROBILINOGEN FLD QL: 0.2 MG/DL — SIGNIFICANT CHANGE UP (ref 0.2–1)
WBC UR QL: 1 /HPF — SIGNIFICANT CHANGE UP (ref 0–5)

## 2023-09-23 PROCEDURE — 93010 ELECTROCARDIOGRAM REPORT: CPT

## 2023-09-23 PROCEDURE — 85025 COMPLETE CBC W/AUTO DIFF WBC: CPT

## 2023-09-23 PROCEDURE — 99285 EMERGENCY DEPT VISIT HI MDM: CPT | Mod: 25

## 2023-09-23 PROCEDURE — 93005 ELECTROCARDIOGRAM TRACING: CPT

## 2023-09-23 PROCEDURE — 70450 CT HEAD/BRAIN W/O DYE: CPT | Mod: MA

## 2023-09-23 PROCEDURE — 87086 URINE CULTURE/COLONY COUNT: CPT

## 2023-09-23 PROCEDURE — 71046 X-RAY EXAM CHEST 2 VIEWS: CPT

## 2023-09-23 PROCEDURE — 72125 CT NECK SPINE W/O DYE: CPT | Mod: MA

## 2023-09-23 PROCEDURE — 84484 ASSAY OF TROPONIN QUANT: CPT

## 2023-09-23 PROCEDURE — 82803 BLOOD GASES ANY COMBINATION: CPT

## 2023-09-23 PROCEDURE — 81001 URINALYSIS AUTO W/SCOPE: CPT

## 2023-09-23 PROCEDURE — 80053 COMPREHEN METABOLIC PANEL: CPT

## 2023-09-23 PROCEDURE — 36415 COLL VENOUS BLD VENIPUNCTURE: CPT

## 2023-09-24 LAB
CULTURE RESULTS: SIGNIFICANT CHANGE UP
SPECIMEN SOURCE: SIGNIFICANT CHANGE UP

## 2024-01-10 ENCOUNTER — EMERGENCY (EMERGENCY)
Facility: HOSPITAL | Age: 89
LOS: 1 days | Discharge: HOME | End: 2024-01-10
Attending: EMERGENCY MEDICINE
Payer: MEDICARE

## 2024-01-10 VITALS
RESPIRATION RATE: 16 BRPM | HEART RATE: 42 BPM | OXYGEN SATURATION: 99 % | SYSTOLIC BLOOD PRESSURE: 166 MMHG | WEIGHT: 104.94 LBS | HEIGHT: 62 IN | DIASTOLIC BLOOD PRESSURE: 66 MMHG | TEMPERATURE: 98 F

## 2024-01-10 DIAGNOSIS — S62.102A FRACTURE OF UNSPECIFIED CARPAL BONE, LEFT WRIST, INITIAL ENCOUNTER FOR CLOSED FRACTURE: Chronic | ICD-10-CM

## 2024-01-10 DIAGNOSIS — Z90.10 ACQUIRED ABSENCE OF UNSPECIFIED BREAST AND NIPPLE: Chronic | ICD-10-CM

## 2024-01-10 DIAGNOSIS — Z98.89 OTHER SPECIFIED POSTPROCEDURAL STATES: Chronic | ICD-10-CM

## 2024-01-10 DIAGNOSIS — Z98.49 CATARACT EXTRACTION STATUS, UNSPECIFIED EYE: Chronic | ICD-10-CM

## 2024-01-10 LAB
ALBUMIN SERPL ELPH-MCNC: 4.4 G/DL — SIGNIFICANT CHANGE UP (ref 3.3–5)
ALBUMIN SERPL ELPH-MCNC: 4.4 G/DL — SIGNIFICANT CHANGE UP (ref 3.3–5)
ALP SERPL-CCNC: 73 U/L — SIGNIFICANT CHANGE UP (ref 40–120)
ALP SERPL-CCNC: 73 U/L — SIGNIFICANT CHANGE UP (ref 40–120)
ALT FLD-CCNC: 26 U/L — SIGNIFICANT CHANGE UP (ref 10–45)
ALT FLD-CCNC: 26 U/L — SIGNIFICANT CHANGE UP (ref 10–45)
ANION GAP SERPL CALC-SCNC: 16 MMOL/L — SIGNIFICANT CHANGE UP (ref 5–17)
ANION GAP SERPL CALC-SCNC: 16 MMOL/L — SIGNIFICANT CHANGE UP (ref 5–17)
APPEARANCE UR: CLEAR — SIGNIFICANT CHANGE UP
APPEARANCE UR: CLEAR — SIGNIFICANT CHANGE UP
AST SERPL-CCNC: 31 U/L — SIGNIFICANT CHANGE UP (ref 10–40)
AST SERPL-CCNC: 31 U/L — SIGNIFICANT CHANGE UP (ref 10–40)
BACTERIA # UR AUTO: NEGATIVE /HPF — SIGNIFICANT CHANGE UP
BACTERIA # UR AUTO: NEGATIVE /HPF — SIGNIFICANT CHANGE UP
BASE EXCESS BLDV CALC-SCNC: 2 MMOL/L — SIGNIFICANT CHANGE UP (ref -2–3)
BASE EXCESS BLDV CALC-SCNC: 2 MMOL/L — SIGNIFICANT CHANGE UP (ref -2–3)
BASOPHILS # BLD AUTO: 0.02 K/UL — SIGNIFICANT CHANGE UP (ref 0–0.2)
BASOPHILS # BLD AUTO: 0.02 K/UL — SIGNIFICANT CHANGE UP (ref 0–0.2)
BASOPHILS NFR BLD AUTO: 0.2 % — SIGNIFICANT CHANGE UP (ref 0–2)
BASOPHILS NFR BLD AUTO: 0.2 % — SIGNIFICANT CHANGE UP (ref 0–2)
BILIRUB SERPL-MCNC: 0.3 MG/DL — SIGNIFICANT CHANGE UP (ref 0.2–1.2)
BILIRUB SERPL-MCNC: 0.3 MG/DL — SIGNIFICANT CHANGE UP (ref 0.2–1.2)
BILIRUB UR-MCNC: NEGATIVE — SIGNIFICANT CHANGE UP
BILIRUB UR-MCNC: NEGATIVE — SIGNIFICANT CHANGE UP
BUN SERPL-MCNC: 23 MG/DL — SIGNIFICANT CHANGE UP (ref 7–23)
BUN SERPL-MCNC: 23 MG/DL — SIGNIFICANT CHANGE UP (ref 7–23)
CA-I SERPL-SCNC: 1.16 MMOL/L — SIGNIFICANT CHANGE UP (ref 1.15–1.33)
CA-I SERPL-SCNC: 1.16 MMOL/L — SIGNIFICANT CHANGE UP (ref 1.15–1.33)
CALCIUM SERPL-MCNC: 9.7 MG/DL — SIGNIFICANT CHANGE UP (ref 8.4–10.5)
CALCIUM SERPL-MCNC: 9.7 MG/DL — SIGNIFICANT CHANGE UP (ref 8.4–10.5)
CAST: 2 /LPF — SIGNIFICANT CHANGE UP (ref 0–4)
CAST: 2 /LPF — SIGNIFICANT CHANGE UP (ref 0–4)
CHLORIDE BLDV-SCNC: 99 MMOL/L — SIGNIFICANT CHANGE UP (ref 96–108)
CHLORIDE BLDV-SCNC: 99 MMOL/L — SIGNIFICANT CHANGE UP (ref 96–108)
CHLORIDE SERPL-SCNC: 100 MMOL/L — SIGNIFICANT CHANGE UP (ref 96–108)
CHLORIDE SERPL-SCNC: 100 MMOL/L — SIGNIFICANT CHANGE UP (ref 96–108)
CO2 BLDV-SCNC: 28 MMOL/L — HIGH (ref 22–26)
CO2 BLDV-SCNC: 28 MMOL/L — HIGH (ref 22–26)
CO2 SERPL-SCNC: 24 MMOL/L — SIGNIFICANT CHANGE UP (ref 22–31)
CO2 SERPL-SCNC: 24 MMOL/L — SIGNIFICANT CHANGE UP (ref 22–31)
COLOR SPEC: YELLOW — SIGNIFICANT CHANGE UP
COLOR SPEC: YELLOW — SIGNIFICANT CHANGE UP
CREAT SERPL-MCNC: 0.81 MG/DL — SIGNIFICANT CHANGE UP (ref 0.5–1.3)
CREAT SERPL-MCNC: 0.81 MG/DL — SIGNIFICANT CHANGE UP (ref 0.5–1.3)
DIFF PNL FLD: ABNORMAL
DIFF PNL FLD: ABNORMAL
EGFR: 70 ML/MIN/1.73M2 — SIGNIFICANT CHANGE UP
EGFR: 70 ML/MIN/1.73M2 — SIGNIFICANT CHANGE UP
EOSINOPHIL # BLD AUTO: 0.05 K/UL — SIGNIFICANT CHANGE UP (ref 0–0.5)
EOSINOPHIL # BLD AUTO: 0.05 K/UL — SIGNIFICANT CHANGE UP (ref 0–0.5)
EOSINOPHIL NFR BLD AUTO: 0.6 % — SIGNIFICANT CHANGE UP (ref 0–6)
EOSINOPHIL NFR BLD AUTO: 0.6 % — SIGNIFICANT CHANGE UP (ref 0–6)
FLUAV AG NPH QL: SIGNIFICANT CHANGE UP
FLUAV AG NPH QL: SIGNIFICANT CHANGE UP
FLUBV AG NPH QL: SIGNIFICANT CHANGE UP
FLUBV AG NPH QL: SIGNIFICANT CHANGE UP
GAS PNL BLDV: 133 MMOL/L — LOW (ref 136–145)
GAS PNL BLDV: 133 MMOL/L — LOW (ref 136–145)
GAS PNL BLDV: SIGNIFICANT CHANGE UP
GAS PNL BLDV: SIGNIFICANT CHANGE UP
GLUCOSE BLDV-MCNC: 77 MG/DL — SIGNIFICANT CHANGE UP (ref 70–99)
GLUCOSE BLDV-MCNC: 77 MG/DL — SIGNIFICANT CHANGE UP (ref 70–99)
GLUCOSE SERPL-MCNC: 81 MG/DL — SIGNIFICANT CHANGE UP (ref 70–99)
GLUCOSE SERPL-MCNC: 81 MG/DL — SIGNIFICANT CHANGE UP (ref 70–99)
GLUCOSE UR QL: NEGATIVE MG/DL — SIGNIFICANT CHANGE UP
GLUCOSE UR QL: NEGATIVE MG/DL — SIGNIFICANT CHANGE UP
HCO3 BLDV-SCNC: 26 MMOL/L — SIGNIFICANT CHANGE UP (ref 22–29)
HCO3 BLDV-SCNC: 26 MMOL/L — SIGNIFICANT CHANGE UP (ref 22–29)
HCT VFR BLD CALC: 37.6 % — SIGNIFICANT CHANGE UP (ref 34.5–45)
HCT VFR BLD CALC: 37.6 % — SIGNIFICANT CHANGE UP (ref 34.5–45)
HCT VFR BLDA CALC: 42 % — SIGNIFICANT CHANGE UP (ref 34.5–46.5)
HCT VFR BLDA CALC: 42 % — SIGNIFICANT CHANGE UP (ref 34.5–46.5)
HGB BLD CALC-MCNC: 13.9 G/DL — SIGNIFICANT CHANGE UP (ref 11.7–16.1)
HGB BLD CALC-MCNC: 13.9 G/DL — SIGNIFICANT CHANGE UP (ref 11.7–16.1)
HGB BLD-MCNC: 12.8 G/DL — SIGNIFICANT CHANGE UP (ref 11.5–15.5)
HGB BLD-MCNC: 12.8 G/DL — SIGNIFICANT CHANGE UP (ref 11.5–15.5)
IMM GRANULOCYTES NFR BLD AUTO: 0.4 % — SIGNIFICANT CHANGE UP (ref 0–0.9)
IMM GRANULOCYTES NFR BLD AUTO: 0.4 % — SIGNIFICANT CHANGE UP (ref 0–0.9)
KETONES UR-MCNC: NEGATIVE MG/DL — SIGNIFICANT CHANGE UP
KETONES UR-MCNC: NEGATIVE MG/DL — SIGNIFICANT CHANGE UP
LACTATE BLDV-MCNC: 0.9 MMOL/L — SIGNIFICANT CHANGE UP (ref 0.5–2)
LACTATE BLDV-MCNC: 0.9 MMOL/L — SIGNIFICANT CHANGE UP (ref 0.5–2)
LEUKOCYTE ESTERASE UR-ACNC: ABNORMAL
LEUKOCYTE ESTERASE UR-ACNC: ABNORMAL
LYMPHOCYTES # BLD AUTO: 2 K/UL — SIGNIFICANT CHANGE UP (ref 1–3.3)
LYMPHOCYTES # BLD AUTO: 2 K/UL — SIGNIFICANT CHANGE UP (ref 1–3.3)
LYMPHOCYTES # BLD AUTO: 24.7 % — SIGNIFICANT CHANGE UP (ref 13–44)
LYMPHOCYTES # BLD AUTO: 24.7 % — SIGNIFICANT CHANGE UP (ref 13–44)
MCHC RBC-ENTMCNC: 30.5 PG — SIGNIFICANT CHANGE UP (ref 27–34)
MCHC RBC-ENTMCNC: 30.5 PG — SIGNIFICANT CHANGE UP (ref 27–34)
MCHC RBC-ENTMCNC: 34 GM/DL — SIGNIFICANT CHANGE UP (ref 32–36)
MCHC RBC-ENTMCNC: 34 GM/DL — SIGNIFICANT CHANGE UP (ref 32–36)
MCV RBC AUTO: 89.7 FL — SIGNIFICANT CHANGE UP (ref 80–100)
MCV RBC AUTO: 89.7 FL — SIGNIFICANT CHANGE UP (ref 80–100)
MONOCYTES # BLD AUTO: 0.57 K/UL — SIGNIFICANT CHANGE UP (ref 0–0.9)
MONOCYTES # BLD AUTO: 0.57 K/UL — SIGNIFICANT CHANGE UP (ref 0–0.9)
MONOCYTES NFR BLD AUTO: 7 % — SIGNIFICANT CHANGE UP (ref 2–14)
MONOCYTES NFR BLD AUTO: 7 % — SIGNIFICANT CHANGE UP (ref 2–14)
NEUTROPHILS # BLD AUTO: 5.44 K/UL — SIGNIFICANT CHANGE UP (ref 1.8–7.4)
NEUTROPHILS # BLD AUTO: 5.44 K/UL — SIGNIFICANT CHANGE UP (ref 1.8–7.4)
NEUTROPHILS NFR BLD AUTO: 67.1 % — SIGNIFICANT CHANGE UP (ref 43–77)
NEUTROPHILS NFR BLD AUTO: 67.1 % — SIGNIFICANT CHANGE UP (ref 43–77)
NITRITE UR-MCNC: NEGATIVE — SIGNIFICANT CHANGE UP
NITRITE UR-MCNC: NEGATIVE — SIGNIFICANT CHANGE UP
NRBC # BLD: 0 /100 WBCS — SIGNIFICANT CHANGE UP (ref 0–0)
NRBC # BLD: 0 /100 WBCS — SIGNIFICANT CHANGE UP (ref 0–0)
NT-PROBNP SERPL-SCNC: 469 PG/ML — HIGH (ref 0–300)
NT-PROBNP SERPL-SCNC: 469 PG/ML — HIGH (ref 0–300)
PCO2 BLDV: 40 MMHG — SIGNIFICANT CHANGE UP (ref 39–42)
PCO2 BLDV: 40 MMHG — SIGNIFICANT CHANGE UP (ref 39–42)
PH BLDV: 7.43 — SIGNIFICANT CHANGE UP (ref 7.32–7.43)
PH BLDV: 7.43 — SIGNIFICANT CHANGE UP (ref 7.32–7.43)
PH UR: 7 — SIGNIFICANT CHANGE UP (ref 5–8)
PH UR: 7 — SIGNIFICANT CHANGE UP (ref 5–8)
PLATELET # BLD AUTO: 197 K/UL — SIGNIFICANT CHANGE UP (ref 150–400)
PLATELET # BLD AUTO: 197 K/UL — SIGNIFICANT CHANGE UP (ref 150–400)
PO2 BLDV: 36 MMHG — SIGNIFICANT CHANGE UP (ref 25–45)
PO2 BLDV: 36 MMHG — SIGNIFICANT CHANGE UP (ref 25–45)
POTASSIUM BLDV-SCNC: 3.9 MMOL/L — SIGNIFICANT CHANGE UP (ref 3.5–5.1)
POTASSIUM BLDV-SCNC: 3.9 MMOL/L — SIGNIFICANT CHANGE UP (ref 3.5–5.1)
POTASSIUM SERPL-MCNC: 3.7 MMOL/L — SIGNIFICANT CHANGE UP (ref 3.5–5.3)
POTASSIUM SERPL-MCNC: 3.7 MMOL/L — SIGNIFICANT CHANGE UP (ref 3.5–5.3)
POTASSIUM SERPL-SCNC: 3.7 MMOL/L — SIGNIFICANT CHANGE UP (ref 3.5–5.3)
POTASSIUM SERPL-SCNC: 3.7 MMOL/L — SIGNIFICANT CHANGE UP (ref 3.5–5.3)
PROT SERPL-MCNC: 6.9 G/DL — SIGNIFICANT CHANGE UP (ref 6–8.3)
PROT SERPL-MCNC: 6.9 G/DL — SIGNIFICANT CHANGE UP (ref 6–8.3)
PROT UR-MCNC: NEGATIVE MG/DL — SIGNIFICANT CHANGE UP
PROT UR-MCNC: NEGATIVE MG/DL — SIGNIFICANT CHANGE UP
RBC # BLD: 4.19 M/UL — SIGNIFICANT CHANGE UP (ref 3.8–5.2)
RBC # BLD: 4.19 M/UL — SIGNIFICANT CHANGE UP (ref 3.8–5.2)
RBC # FLD: 14.5 % — SIGNIFICANT CHANGE UP (ref 10.3–14.5)
RBC # FLD: 14.5 % — SIGNIFICANT CHANGE UP (ref 10.3–14.5)
RBC CASTS # UR COMP ASSIST: 5 /HPF — HIGH (ref 0–4)
RBC CASTS # UR COMP ASSIST: 5 /HPF — HIGH (ref 0–4)
RSV RNA NPH QL NAA+NON-PROBE: SIGNIFICANT CHANGE UP
RSV RNA NPH QL NAA+NON-PROBE: SIGNIFICANT CHANGE UP
SAO2 % BLDV: 65.6 % — LOW (ref 67–88)
SAO2 % BLDV: 65.6 % — LOW (ref 67–88)
SARS-COV-2 RNA SPEC QL NAA+PROBE: SIGNIFICANT CHANGE UP
SARS-COV-2 RNA SPEC QL NAA+PROBE: SIGNIFICANT CHANGE UP
SODIUM SERPL-SCNC: 140 MMOL/L — SIGNIFICANT CHANGE UP (ref 135–145)
SODIUM SERPL-SCNC: 140 MMOL/L — SIGNIFICANT CHANGE UP (ref 135–145)
SP GR SPEC: 1.01 — SIGNIFICANT CHANGE UP (ref 1–1.03)
SP GR SPEC: 1.01 — SIGNIFICANT CHANGE UP (ref 1–1.03)
SQUAMOUS # UR AUTO: 0 /HPF — SIGNIFICANT CHANGE UP (ref 0–5)
SQUAMOUS # UR AUTO: 0 /HPF — SIGNIFICANT CHANGE UP (ref 0–5)
TROPONIN T, HIGH SENSITIVITY RESULT: 21 NG/L — SIGNIFICANT CHANGE UP (ref 0–51)
TROPONIN T, HIGH SENSITIVITY RESULT: 21 NG/L — SIGNIFICANT CHANGE UP (ref 0–51)
TROPONIN T, HIGH SENSITIVITY RESULT: 22 NG/L — SIGNIFICANT CHANGE UP (ref 0–51)
TROPONIN T, HIGH SENSITIVITY RESULT: 22 NG/L — SIGNIFICANT CHANGE UP (ref 0–51)
UROBILINOGEN FLD QL: 0.2 MG/DL — SIGNIFICANT CHANGE UP (ref 0.2–1)
UROBILINOGEN FLD QL: 0.2 MG/DL — SIGNIFICANT CHANGE UP (ref 0.2–1)
WBC # BLD: 8.11 K/UL — SIGNIFICANT CHANGE UP (ref 3.8–10.5)
WBC # BLD: 8.11 K/UL — SIGNIFICANT CHANGE UP (ref 3.8–10.5)
WBC # FLD AUTO: 8.11 K/UL — SIGNIFICANT CHANGE UP (ref 3.8–10.5)
WBC # FLD AUTO: 8.11 K/UL — SIGNIFICANT CHANGE UP (ref 3.8–10.5)
WBC UR QL: 12 /HPF — HIGH (ref 0–5)
WBC UR QL: 12 /HPF — HIGH (ref 0–5)

## 2024-01-10 PROCEDURE — 99223 1ST HOSP IP/OBS HIGH 75: CPT | Mod: FS

## 2024-01-10 PROCEDURE — 71045 X-RAY EXAM CHEST 1 VIEW: CPT | Mod: 26

## 2024-01-10 NOTE — ED PROVIDER NOTE - ATTENDING CONTRIBUTION TO CARE
Attending MD Joseph:  I have seen and examined this patient and fully participated in the care of this patient as the teaching attending. I personally made/approved the management plan and take responsibility for the patient management.      88-year-old woman with a history of AAA, hypertension hyperlipidemia presenting for evaluation of 1 month of poor p.o. intake generalized fatigue and yesterday and today feeling dizzy and lightheaded.  Denies pain anywhere specifically, no chest pain no abdominal pain no flank pain no back pain.  Patient has been feeling short of breath with exertion for months as well.    Initial vital signs were notable for triage heart rate 42, on recheck heart rate in the 60s to 70s here, patient is well-appearing lying in the stretcher no apparent distress.  Heart sounds regular clear lungs anteriorly abdomen nontender extremities warm and well-perfused.  Moves all extremity spontaneously.    ECG recorded at 1639 independently interpreted by me, Dr Chilo Joseph, shows normal sinus rhythm left axis deviation normal intervals no ST elevation or ST depression.    Patient is presenting for evaluation of subacute symptoms.  Primary concern would be for possible occult malignancy electrolyte derangements symptomatic anemia, less likely ACS.  Could also consider symptomatic bradycardia given patient was bradycardic on arrival.  Will maintain on telemetry, obtain screening labs chest film urinalysis, patient will likely warrant admission for further investigation after initial lab work.      *The above represents an initial assessment/impression. Please refer to progress notes for potential changes in patient clinical course*

## 2024-01-10 NOTE — ED CDU PROVIDER INITIAL DAY NOTE - OBJECTIVE STATEMENT
87 yo F with PMH HTN, HLD, AAA with annual follow up via ultrasound presents to ED for new onset lightheadedness today and generalized weakness and fatigue x1 month. Reports she has had 20 lb of unintentional weight loss over past year associated with decreased appetite. Also endorsing exertional shortness of breath. Denies nausea, vomiting, and diaphoresis. Current smoker. ?fhx heart dz brother. Reports normal pharm nuclear stress 2-3 years ago cardiologist Dr. Tom Alves [facility].   In ED, labs nonactionable, trops 21->22, bnp 470, cxr clear, ua w/o overt infx, viral swab neg. CDU for tele, tte, cardiac stress. 87 yo F with PMH HTN, HLD, AAA with annual follow up via ultrasound presents to ED for new onset lightheadedness today and generalized weakness and fatigue x1 month. Reports she has had 20 lb of unintentional weight loss over past year associated with decreased appetite. Also endorsing exertional shortness of breath. Denies nausea, vomiting, and diaphoresis. Current smoker. ?fhx heart dz brother. Reports normal pharm nuclear stress 2-3 years ago cardiologist Dr. Tom Avles [facility].   In ED, labs nonactionable, trops 21->22, bnp 470, cxr clear, ua w/o overt infx, viral swab neg. CDU for tele, tte, cardiac stress. 89 yo F with PMH HTN, HLD, AAA with annual follow up via ultrasound presents to ED for new onset lightheadedness today and generalized weakness and fatigue x1 month. Reports she has had 20 lb of unintentional weight loss over past year associated with decreased appetite. Also endorsing exertional shortness of breath. Denies nausea, vomiting, and diaphoresis. Current smoker. ?fhx heart dz brother. Reports normal pharm nuclear stress 2-3 years ago cardiologist Dr. Tom Alves [facility].   In ED, labs nonactionable, trops 21->22, bnp 470, cxr clear, ua w/o overt infx, viral swab neg. CDU for tele, tte, cardiac stress.

## 2024-01-10 NOTE — ED PROVIDER NOTE - OBJECTIVE STATEMENT
87 yo F with PMH HTN, HLD, AAA with annual follow up via ultrasound presents to ED for new onset lightheadedness today and generalized weakness and fatigue x1 month. Reports she has had 20 lb of unintentional weight loss over past year associated with decreased appetite. Also endorsing exertional shortness of breath. Denies nausea, vomiting, and diaphoresis.

## 2024-01-10 NOTE — ED PROVIDER NOTE - PHYSICAL EXAMINATION
GEN: Pt in NAD, A&O x3. GCS 15  EYES: Sclera white w/o injection, PERRLA, EOMI.  ENT: Head NCAT.  RESP: No chest wall tenderness, CTA b/l, no wheezes, rales, or rhonchi.   CARDIAC: RRR, clear distinct S1, S2, no murmurs, gallops, or rubs. NSR at 60-80 on monitor during bedside exam  ABD: Abdomen non-distended, soft, non-tender, no rebound or guarding. No CVAT b/l.  VASC: 2+ carotid, radial, and dorsalis pedis pulses b/l. No edema or tenderness of the lower extremities.  NEURO: CN 2-12 grossly intact. Normal and equal sensation UE, LE and face b/l. 5/5 strength UE and LE b/l.  Pronator drift negative. Normal gait and gross cerebellar functioning.  MSK: No joint erythema or obvious deformities. Spine without obvious deformity. No midline or paraspinal tenderness. FROM w/o pain of upper and lower extremities b/l.  SKIN: No rashes noted.

## 2024-01-10 NOTE — ED CDU PROVIDER INITIAL DAY NOTE - DETAILS
Lightheadedness, fatigue, exertional dyspnea  -tele  -tte  -stress
Time-based billing (NON-critical care)

## 2024-01-10 NOTE — ED ADULT NURSE NOTE - NSFALLUNIVINTERV_ED_ALL_ED
Bed/Stretcher in lowest position, wheels locked, appropriate side rails in place/Call bell, personal items and telephone in reach/Instruct patient to call for assistance before getting out of bed/chair/stretcher/Non-slip footwear applied when patient is off stretcher/[place] to call system/Physically safe environment - no spills, clutter or unnecessary equipment/Purposeful proactive rounding/Room/bathroom lighting operational, light cord in reach Bed/Stretcher in lowest position, wheels locked, appropriate side rails in place/Call bell, personal items and telephone in reach/Instruct patient to call for assistance before getting out of bed/chair/stretcher/Non-slip footwear applied when patient is off stretcher/[place] to call system/Physically safe environment - no spills, clutter or unnecessary equipment/Purposeful proactive rounding/Room/bathroom lighting operational, light cord in reach

## 2024-01-10 NOTE — ED PROVIDER NOTE - CLINICAL SUMMARY MEDICAL DECISION MAKING FREE TEXT BOX
88 year old female presents with generalized malaise and lightheadedness associated with exertional dyspnea. Differential includes weakness 2/2 electrolyte derangements vs UTI vs pna vs decreased PO intake.    Will obtain labs to rule out electrolyte abnormalities, CXR and UA to rule out infectious etiology of weakness, and trop to evaluate if patient has unstable angina or new onset heart failure that is precipitating her symptoms of exertional dyspnea.

## 2024-01-10 NOTE — ED ADULT NURSE NOTE - ED STAT RN HANDOFF DETAILS
Report given to RN , understands dispo, no questions at time of report Report given to CHERELLE Daniel, understands dispo, no questions at time of report

## 2024-01-10 NOTE — ED PROVIDER NOTE - NS ED ROS FT
CONST: no fevers, no chills, (+) generalized malaise  EYES: no redness, no vision changes   HENT: no throat pain, no epistaxis  CV: no chest pain, no palpitations     RESP: (+) exertional shortness of breath, no cough  ABD: no abdominal pain, no vomiting     : no dysuria, no hematuria   MSK: no muscle pain, no joint swelling     NEURO: no headache, no focal weakness or loss of sensation     SKIN:  no rash, no lacerations.

## 2024-01-10 NOTE — ED ADULT NURSE NOTE - OBJECTIVE STATEMENT
88 y.o. female coming in from home via private car for weakness. pt states that she has been having generalized weakness x 1 month with a 20 lb weightloss over the last 2 years. pt states that she has also had a decreased appetite over the last month. PMH AAA, HLD, HTN. A&Ox3, vss, NSR on tele, denies CP/dizziness/SOB/weakness/urinary symptoms/N/V/D, no other complaints at this time.

## 2024-01-10 NOTE — ED CDU PROVIDER INITIAL DAY NOTE - ATTENDING APP SHARED VISIT CONTRIBUTION OF CARE
Attending MD Joseph: I personally made/approved the management plan and take responsibility for the patient management.

## 2024-01-10 NOTE — ED CDU PROVIDER INITIAL DAY NOTE - PHYSICAL EXAMINATION
GEN: Pt non-toxic in NAD, alert.  PSYCH: Affect and mood appropriate.  EYES: Sclera white w/o injection.   ENT: Neck supple. Airway patent.  RESP: CTA b/l, no wheezes, rales, or rhonchi.   CARDIAC: RRR, clear distinct S1, S2, no appreciable murmurs.  ABD: Soft, non-tender.  MSK: ROJAS. FROM throughout.  NEURO: No focal motor or sensory deficits.  VASC: Strong distal pulses. No edema. No calf tenderness.  SKIN: No rashes or lesions.

## 2024-01-10 NOTE — ED PROVIDER NOTE - PROGRESS NOTE DETAILS
Estuardo Cid, PGY3 This patient was  signed out to me.   Patient was signed out that she was bradycardia to 40s in triage but her EKG was normal sinus rhythm, not bradycardia.   Reassessed at bedside, heart rate 68, feels well having no complaints. pt cards is Dr guadalupe.   Patient has not had a cardiac workup in over a year and was scheduled to have a workup in the next few weeks.    Patient pending repeat troponin.

## 2024-01-11 VITALS
DIASTOLIC BLOOD PRESSURE: 74 MMHG | SYSTOLIC BLOOD PRESSURE: 121 MMHG | TEMPERATURE: 98 F | OXYGEN SATURATION: 100 % | RESPIRATION RATE: 17 BRPM | HEART RATE: 66 BPM

## 2024-01-11 LAB
A1C WITH ESTIMATED AVERAGE GLUCOSE RESULT: 5.6 % — SIGNIFICANT CHANGE UP (ref 4–5.6)
A1C WITH ESTIMATED AVERAGE GLUCOSE RESULT: 5.6 % — SIGNIFICANT CHANGE UP (ref 4–5.6)
CHOLEST SERPL-MCNC: 147 MG/DL — SIGNIFICANT CHANGE UP
CHOLEST SERPL-MCNC: 147 MG/DL — SIGNIFICANT CHANGE UP
ESTIMATED AVERAGE GLUCOSE: 114 MG/DL — SIGNIFICANT CHANGE UP (ref 68–114)
ESTIMATED AVERAGE GLUCOSE: 114 MG/DL — SIGNIFICANT CHANGE UP (ref 68–114)
HDLC SERPL-MCNC: 70 MG/DL — SIGNIFICANT CHANGE UP
HDLC SERPL-MCNC: 70 MG/DL — SIGNIFICANT CHANGE UP
LIPID PNL WITH DIRECT LDL SERPL: 64 MG/DL — SIGNIFICANT CHANGE UP
LIPID PNL WITH DIRECT LDL SERPL: 64 MG/DL — SIGNIFICANT CHANGE UP
NON HDL CHOLESTEROL: 76 MG/DL — SIGNIFICANT CHANGE UP
NON HDL CHOLESTEROL: 76 MG/DL — SIGNIFICANT CHANGE UP
TRIGL SERPL-MCNC: 62 MG/DL — SIGNIFICANT CHANGE UP
TRIGL SERPL-MCNC: 62 MG/DL — SIGNIFICANT CHANGE UP

## 2024-01-11 PROCEDURE — 93356 MYOCRD STRAIN IMG SPCKL TRCK: CPT

## 2024-01-11 PROCEDURE — 81001 URINALYSIS AUTO W/SCOPE: CPT

## 2024-01-11 PROCEDURE — 93016 CV STRESS TEST SUPVJ ONLY: CPT | Mod: MA

## 2024-01-11 PROCEDURE — 93018 CV STRESS TEST I&R ONLY: CPT | Mod: MA

## 2024-01-11 PROCEDURE — A9500: CPT

## 2024-01-11 PROCEDURE — 85025 COMPLETE CBC W/AUTO DIFF WBC: CPT

## 2024-01-11 PROCEDURE — 83036 HEMOGLOBIN GLYCOSYLATED A1C: CPT

## 2024-01-11 PROCEDURE — 99238 HOSP IP/OBS DSCHRG MGMT 30/<: CPT

## 2024-01-11 PROCEDURE — 82330 ASSAY OF CALCIUM: CPT

## 2024-01-11 PROCEDURE — 84295 ASSAY OF SERUM SODIUM: CPT

## 2024-01-11 PROCEDURE — 82947 ASSAY GLUCOSE BLOOD QUANT: CPT

## 2024-01-11 PROCEDURE — 82803 BLOOD GASES ANY COMBINATION: CPT

## 2024-01-11 PROCEDURE — 83880 ASSAY OF NATRIURETIC PEPTIDE: CPT

## 2024-01-11 PROCEDURE — 83605 ASSAY OF LACTIC ACID: CPT

## 2024-01-11 PROCEDURE — 93017 CV STRESS TEST TRACING ONLY: CPT

## 2024-01-11 PROCEDURE — 78452 HT MUSCLE IMAGE SPECT MULT: CPT | Mod: MA

## 2024-01-11 PROCEDURE — G0378: CPT

## 2024-01-11 PROCEDURE — 99285 EMERGENCY DEPT VISIT HI MDM: CPT | Mod: 25

## 2024-01-11 PROCEDURE — 36415 COLL VENOUS BLD VENIPUNCTURE: CPT

## 2024-01-11 PROCEDURE — 82435 ASSAY OF BLOOD CHLORIDE: CPT

## 2024-01-11 PROCEDURE — 84484 ASSAY OF TROPONIN QUANT: CPT

## 2024-01-11 PROCEDURE — 85018 HEMOGLOBIN: CPT

## 2024-01-11 PROCEDURE — 85014 HEMATOCRIT: CPT

## 2024-01-11 PROCEDURE — 71045 X-RAY EXAM CHEST 1 VIEW: CPT

## 2024-01-11 PROCEDURE — 80061 LIPID PANEL: CPT

## 2024-01-11 PROCEDURE — 93005 ELECTROCARDIOGRAM TRACING: CPT | Mod: XU

## 2024-01-11 PROCEDURE — 93306 TTE W/DOPPLER COMPLETE: CPT

## 2024-01-11 PROCEDURE — 87086 URINE CULTURE/COLONY COUNT: CPT

## 2024-01-11 PROCEDURE — 84132 ASSAY OF SERUM POTASSIUM: CPT

## 2024-01-11 PROCEDURE — 80053 COMPREHEN METABOLIC PANEL: CPT

## 2024-01-11 PROCEDURE — 87637 SARSCOV2&INF A&B&RSV AMP PRB: CPT

## 2024-01-11 PROCEDURE — 78452 HT MUSCLE IMAGE SPECT MULT: CPT | Mod: 26,MA

## 2024-01-11 PROCEDURE — 93306 TTE W/DOPPLER COMPLETE: CPT | Mod: 26

## 2024-01-11 RX ORDER — ATORVASTATIN CALCIUM 80 MG/1
40 TABLET, FILM COATED ORAL AT BEDTIME
Refills: 0 | Status: DISCONTINUED | OUTPATIENT
Start: 2024-01-11 | End: 2024-01-14

## 2024-01-11 RX ORDER — AMLODIPINE BESYLATE 2.5 MG/1
5 TABLET ORAL DAILY
Refills: 0 | Status: DISCONTINUED | OUTPATIENT
Start: 2024-01-11 | End: 2024-01-14

## 2024-01-11 RX ADMIN — AMLODIPINE BESYLATE 5 MILLIGRAM(S): 2.5 TABLET ORAL at 08:12

## 2024-01-11 NOTE — ED ADULT NURSE REASSESSMENT NOTE - NSFALLHARMRISKINTERV_ED_ALL_ED
Assistance OOB with selected safe patient handling equipment if applicable/Assistance with ambulation/Communicate risk of Fall with Harm to all staff, patient, and family/Provide visual cue: red socks, yellow wristband, yellow gown, etc/Reinforce activity limits and safety measures with patient and family/Bed in lowest position, wheels locked, appropriate side rails in place/Call bell, personal items and telephone in reach/Instruct patient to call for assistance before getting out of bed/chair/stretcher/Non-slip footwear applied when patient is off stretcher/[facility] to call system/Physically safe environment - no spills, clutter or unnecessary equipment/Purposeful Proactive Rounding/Room/bathroom lighting operational, light cord in reach Assistance OOB with selected safe patient handling equipment if applicable/Assistance with ambulation/Communicate risk of Fall with Harm to all staff, patient, and family/Provide visual cue: red socks, yellow wristband, yellow gown, etc/Reinforce activity limits and safety measures with patient and family/Bed in lowest position, wheels locked, appropriate side rails in place/Call bell, personal items and telephone in reach/Instruct patient to call for assistance before getting out of bed/chair/stretcher/Non-slip footwear applied when patient is off stretcher/[place] to call system/Physically safe environment - no spills, clutter or unnecessary equipment/Purposeful Proactive Rounding/Room/bathroom lighting operational, light cord in reach

## 2024-01-11 NOTE — ED CDU PROVIDER SUBSEQUENT DAY NOTE - HISTORY
Pt seen at bedside in NAD, resting comfortably w/o new or evolving complaints. VSS. No events on tele. Plan for continued tele, tte and cardiac stress in AM.

## 2024-01-11 NOTE — ED CDU PROVIDER DISPOSITION NOTE - PATIENT PORTAL LINK FT
You can access the FollowMyHealth Patient Portal offered by [facility] by registering at the following website: [email or web address]. By joining [employer]’s FollowMyHealth portal, you will also be able to view your health information using other applications (apps) compatible with our system. You can access the FollowMyHealth Patient Portal offered by [facility] by registering at the following website: [email or web address]. By joining [employer]s FollowMyHealth portal, you will also be able to view your health information using other applications (apps) compatible with our system. You can access the FollowMyHealth Patient Portal offered by [facility] by registering at the following website: [email or web address]. By joining [employer]’s FollowMyHealth portal, you will also be able to view your health information using other applications (apps) compatible with our system.

## 2024-01-11 NOTE — ED CDU PROVIDER SUBSEQUENT DAY NOTE - PROGRESS NOTE DETAILS
I received signout on this patient at the time patient is pending pharm nuc stress test and TTE.  Vital signs stable from last read. Overnight telemetry reviewed, occasional PVCs with 11 beats of ventricular bigeminy at 2:23 AM.  This TTNA resulted EF 73%, normal global longitudinal strain, no regional wall motion abnormalities, grade 1 LV diastolic dysfunction no significant valvular disease, trace TR, no pericardial effusion.  Patient pending stress test. Stress test results revealed normal myocardial perfusion scan no evidence of infarct or inducible ischemia.  Left ventricular EF 55% during stress. VSS from last read, pt has had intermittent bouts of ventricular bigeminy in CDU but has been asx during these episodes. attempted to reach pt cardiologist to discuss results and CDU stay. unable to reach pt cardiologist despite multiple attempts. d/w CDU attending who is comfortable with DC and outpatient cardiology f/u at this time. Pt ready and stable for DC at this time. Pt DC'ed but after DC called by MO Braga called from cardiologist office. Relayed ED course, and reports they will put it in the chart to be addressed at f/u appt.

## 2024-01-11 NOTE — ED ADULT NURSE REASSESSMENT NOTE - NS ED NURSE REASSESS COMMENT FT1
Pt received from CHERELLE Mullins. Pt A&O X4, oriented to CDU & plan of care discussed. Pt in CDU for Telemetry, Echo and nuclear stress test. Pt denies any chest pain, SOB, dizziness or palpitations at this time. V/S stable, IV patent and free of signs of infiltration. Pt ambulated to BR with minimal assist. Fall and safety precautions reviewed with patient, Pt verbalized understanding of the same. Call bell in reach. Will continue to monitor.
07.00 Received the Pt from  CHERELLE Whitt  Pt is Observed for weakness for Nuclear stress test. Received the Pt A&OX 4  Pt obeys commands Opal N/V/D fever chills cp SOB   Comfort care & safety measures continued  IV site looks clean & dry no signs of infiltration noted pt denies  pain IV site .Pt is oriented to the unit Plan of care explained .  Pt is advised to call for help  call bell with in the reach pt verbalized the understanding .  pending CDU  MD hamm . GCS 15/15 A&OX 4 PERRLA  size 3 Strong upper & lower extremities steady gait  Pt is ambulatory & independent   No facial droop  No Hand Leg drop denies numbness tingling  Plan of care ongoing

## 2024-01-11 NOTE — ED CDU PROVIDER DISPOSITION NOTE - CLINICAL COURSE
89 yo F with PMH HTN, HLD, AAA with annual follow up via ultrasound presents to ED for new onset lightheadedness today and generalized weakness and fatigue x1 month. Reports she has had 20 lb of unintentional weight loss over past year associated with decreased appetite. Also endorsing exertional shortness of breath. Denies nausea, vomiting, and diaphoresis. Current smoker. ?fhx heart dz brother. Reports normal pharm nuclear stress 2-3 years ago cardiologist Dr. Tom Alves [facility].   In ED, labs nonactionable, trops 21->22, bnp 470, cxr clear, ua w/o overt infx, viral swab neg. CDU for tele, tte, cardiac stress.  In CDU, 87 yo F with PMH HTN, HLD, AAA with annual follow up via ultrasound presents to ED for new onset lightheadedness today and generalized weakness and fatigue x1 month. Reports she has had 20 lb of unintentional weight loss over past year associated with decreased appetite. Also endorsing exertional shortness of breath. Denies nausea, vomiting, and diaphoresis. Current smoker. ?fhx heart dz brother. Reports normal pharm nuclear stress 2-3 years ago cardiologist Dr. Tom Alves [facility].   In ED, labs nonactionable, trops 21->22, bnp 470, cxr clear, ua w/o overt infx, viral swab neg. CDU for tele, tte, cardiac stress.  In CDU, 87 yo F with PMH HTN, HLD, AAA with annual follow up via ultrasound presents to ED for new onset lightheadedness today and generalized weakness and fatigue x1 month. Reports she has had 20 lb of unintentional weight loss over past year associated with decreased appetite. Also endorsing exertional shortness of breath. Denies nausea, vomiting, and diaphoresis. Current smoker. ?fhx heart dz brother. Reports normal pharm nuclear stress 2-3 years ago cardiologist Dr. Tom Alves [facility].   In ED, labs nonactionable, trops 21->22, bnp 470, cxr clear, ua w/o overt infx, viral swab neg. CDU for tele, tte, cardiac stress.  In CDU, 89 yo F with PMH HTN, HLD, AAA with annual follow up via ultrasound presents to ED for new onset lightheadedness today and generalized weakness and fatigue x1 month. Reports she has had 20 lb of unintentional weight loss over past year associated with decreased appetite. Also endorsing exertional shortness of breath. Denies nausea, vomiting, and diaphoresis. Current smoker. ?fhx heart dz brother. Reports normal pharm nuclear stress 2-3 years ago cardiologist Dr. Tom Alves [facility].   In ED, labs nonactionable, trops 21->22, bnp 470, cxr clear, ua w/o overt infx, viral swab neg. CDU for tele, tte, cardiac stress.  In CDU, TTE - grade 1 diastolic dysfunction and trace TR, no WMA. stress test - normal myocrdial perfusion scan. Pt with occasional episodes opf ventricular bigeminy in the CDU but was asx during episodes in which she was awake. 89 yo F with PMH HTN, HLD, AAA with annual follow up via ultrasound presents to ED for new onset lightheadedness today and generalized weakness and fatigue x1 month. Reports she has had 20 lb of unintentional weight loss over past year associated with decreased appetite. Also endorsing exertional shortness of breath. Denies nausea, vomiting, and diaphoresis. Current smoker. ?fhx heart dz brother. Reports normal pharm nuclear stress 2-3 years ago cardiologist Dr. Tom Alves [facility].   In ED, labs nonactionable, trops 21->22, bnp 470, cxr clear, ua w/o overt infx, viral swab neg. CDU for tele, tte, cardiac stress.  In CDU, TTE - grade 1 diastolic dysfunction and trace TR, no WMA. stress test - normal myocrdial perfusion scan. Pt with occasional episodes opf ventricular bigeminy in the CDU but was asx during episodes in which she was awake. 89 yo F with PMH HTN, HLD, AAA with annual follow up via ultrasound presents to ED for new onset lightheadedness today and generalized weakness and fatigue x1 month. Reports she has had 20 lb of unintentional weight loss over past year associated with decreased appetite. Also endorsing exertional shortness of breath. Denies nausea, vomiting, and diaphoresis. Current smoker. ?fhx heart dz brother. Reports normal pharm nuclear stress 2-3 years ago cardiologist Dr. Tom Alves [facility].   In ED, labs nonactionable, trops 21->22, bnp 470, cxr clear, ua w/o overt infx, viral swab neg. CDU for tele, tte, cardiac stress.  In CDU, TTE - grade 1 diastolic dysfunction and trace TR, no WMA. stress test - normal myocrdial perfusion scan. Pt with occasional episodes opf ventricular bigeminy in the CDU but was asx during episodes in which she was awake. 89 yo F with PMH HTN, HLD, AAA with annual follow up via ultrasound presents to ED for new onset lightheadedness today and generalized weakness and fatigue x1 month. Reports she has had 20 lb of unintentional weight loss over past year associated with decreased appetite. Also endorsing exertional shortness of breath. Denies nausea, vomiting, and diaphoresis. Current smoker. ?fhx heart dz brother. Reports normal pharm nuclear stress 2-3 years ago cardiologist Dr. Tom Alves [facility].   In ED, labs nonactionable, trops 21->22, bnp 470, cxr clear, ua w/o overt infx, viral swab neg. CDU for tele, tte, cardiac stress.  In CDU, TTE - grade 1 diastolic dysfunction and trace TR, no WMA. stress test - normal myocrdial perfusion scan. Pt with occasional episodes of ventricular bigeminy in the CDU but was asx during episodes in which she was awake. Attempted to reach out to pt cardiologist office but received no call back. Pt ready and stable at time of DC, will f/u with cardiology outpatient. 89 yo F with PMH HTN, HLD, AAA with annual follow up via ultrasound presents to ED for new onset lightheadedness today and generalized weakness and fatigue x1 month. Reports she has had 20 lb of unintentional weight loss over past year associated with decreased appetite. Also endorsing exertional shortness of breath. Denies nausea, vomiting, and diaphoresis. Current smoker. ?fhx heart dz brother. Reports normal pharm nuclear stress 2-3 years ago cardiologist Dr. Tom Alves [facility].   In ED, labs nonactionable, trops 21->22, bnp 470, cxr clear, ua w/o overt infx, viral swab neg. CDU for tele, tte, cardiac stress.  In CDU, TTE - grade 1 diastolic dysfunction and trace TR, no WMA. stress test - normal myocrdial perfusion scan. Pt with occasional episodes of ventricular bigeminy in the CDU but was asx during episodes in which she was awake. Attempted to reach out to pt cardiologist office but received no call back. Pt ready and stable at time of DC, will f/u with cardiology outpatient. 87 yo F with PMH HTN, HLD, AAA with annual follow up via ultrasound presents to ED for new onset lightheadedness today and generalized weakness and fatigue x1 month. Reports she has had 20 lb of unintentional weight loss over past year associated with decreased appetite. Also endorsing exertional shortness of breath. Denies nausea, vomiting, and diaphoresis. Current smoker. ?fhx heart dz brother. Reports normal pharm nuclear stress 2-3 years ago cardiologist Dr. Tom Alves [facility].   In ED, labs nonactionable, trops 21->22, bnp 470, cxr clear, ua w/o overt infx, viral swab neg. CDU for tele, tte, cardiac stress.  In CDU, TTE - grade 1 diastolic dysfunction and trace TR, no WMA. stress test - normal myocrdial perfusion scan. Pt with occasional episodes of ventricular bigeminy in the CDU but was asx during episodes in which she was awake. Attempted to reach out to pt cardiologist office but received no call back. Pt ready and stable at time of DC, will f/u with cardiology outpatient.

## 2024-01-11 NOTE — ED CDU PROVIDER DISPOSITION NOTE - ATTENDING APP SHARED VISIT CONTRIBUTION OF CARE
admitted to obs for lightheadedness/weakness.  s/p stress here, unremarkable.  had self-limited asymptomatic bigeminy briefly overnight.  has outpatient cardiologist to f/u with.  stable for discharge.

## 2024-01-11 NOTE — ED CDU PROVIDER SUBSEQUENT DAY NOTE - ATTENDING APP SHARED VISIT CONTRIBUTION OF CARE
Attending MD Jovel:   I personally have seen and examined this patient.  Physician assistant note reviewed and agree on plan of care and except where noted.  See below for details.     Seen in CDU Rodney Crow 46    88F with PMH/PSH including HTN, HLD, AAA sent to the CDU after presenting to the ED with weakness, fatigue, lightheadedness.  Reports feeling unchanged.  Patient with bigeminy overnight, denies chest pain, shortness of breath.  Pending pharm nuc stress test and TTE.      Exam:   General: NAD  HENT: head NCAT, airway patent  Eyes: anicteric, no conjunctival injection   Lungs: lungs CTAB with good inspiratory effort, no wheezing, no rhonchi, no rales  Cardiac: +S1S2, no obvious m/r/g  GI: abdomen soft with +BS, NT, ND  MSK: ranging neck and extremities freely  Neuro: moving all extremities spontaneously, nonfocal  Psych: normal mood and affect     A/P: 88F with weakness, lightheadedness, with bigeminy pending stress and echo

## 2024-01-11 NOTE — ED CDU PROVIDER DISPOSITION NOTE - NSFOLLOWUPINSTRUCTIONS_ED_ALL_ED_FT
1) Follow-up with your primary care provider and cardiologist within 1-2 weeks. Bring all printed discharge results and instructions with you to your follow up appointments.    2) Continue to take all medications as prescribed.    3) Return to the ER for any new or worsening symptoms. 1) Follow-up with your primary care provider and cardiologist within 1-2 weeks.     Bring all printed discharge results and instructions with you to your follow up appointments.    You had episodes of ventricular bigeminy on the cardiac monitor while in the ER, but did not have any symptoms during these episodes. Please discuss this with your cardiologist.     2) Continue to take all medications as prescribed.    3) Return to the ER for any new or worsening symptoms, lightheadedness, dizziness, fainting, vomiting, new or worsening chest pain, or if any other concerns. 1) Follow-up with your primary care provider and cardiologist within 1 week.     Bring all printed discharge results and instructions with you to your follow up appointments.    You had episodes of ventricular bigeminy on the cardiac monitor while in the ER, but did not have any symptoms during these episodes. Please discuss this with your cardiologist.     2) Continue to take all medications as prescribed.    3) Return to the ER for any new or worsening symptoms, lightheadedness, dizziness, fainting, vomiting, new or worsening chest pain, or if any other concerns.

## 2024-01-12 PROBLEM — H35.30 UNSPECIFIED MACULAR DEGENERATION: Chronic | Status: ACTIVE | Noted: 2023-09-23

## 2024-01-12 LAB
CULTURE RESULTS: SIGNIFICANT CHANGE UP
CULTURE RESULTS: SIGNIFICANT CHANGE UP
SPECIMEN SOURCE: SIGNIFICANT CHANGE UP
SPECIMEN SOURCE: SIGNIFICANT CHANGE UP

## 2024-02-27 PROBLEM — E78.5 HYPERLIPIDEMIA, UNSPECIFIED: Chronic | Status: ACTIVE | Noted: 2024-01-10

## 2024-02-27 PROBLEM — I10 ESSENTIAL (PRIMARY) HYPERTENSION: Chronic | Status: ACTIVE | Noted: 2024-01-10

## 2024-03-19 NOTE — ED CDU PROVIDER SUBSEQUENT DAY NOTE - NS ED MD PROGRESS NOTE PROVIDER NAME4 FT
What Type Of Note Output Would You Prefer (Optional)?: Bullet Format
Hpi Title: Evaluation of Skin Lesions
Additional History: Pt has brown spot on scalp x 5 years and itchy red spot in l arm x 3 weeks.
-Sb Ray PA-C

## 2024-03-19 NOTE — ED ADULT NURSE NOTE - NS ED NURSE DC INFO COMPLEXITY
Plan/anticipatory guidance: Discussed the following with patient and parent(s)/guardian and educational materials provided  Nutrition/feeding- eat 5 fruits/veg daily, limit fried foods, fast food, junk food and sugary drinks, Drink water or fat free milk (20-24 ounces daily to get recommended calcium)  Participate in > 2 hour of physical activity or active play daily    SAFETY:   Car-seat: proper booster seat use until lap and seatbelt fit. Seatbelt use. Back seat until child is around 14 yo.  Water:  drowning leading cause of death in 7-8 yos.  No swimming alone even if good swimmer  Street safety:  teach child how to cross the street safely.  Always be aware of surroundings.   8 year olds are not old enough to rid bike at dusk or after dark  Brain trauma prevention:  Wear helmet for biking, skiing and other activities that can cause a high impact injury  Emergencies: Teach child what to do in the case of an emergency; how to dial 911.    Gun Safety:  teach child to never touch any guns.  All guns should be locked up and unloaded in a safe.  Fire safety:  ensure all homes have fire and carbon monoxide detectors.  Internet safety:  always supervise and consider parental controls.  LIMIT screen time  Child abuse prevention:  Teach your child the different between good touch and bad touch, and to report any bad touches.  Also teach it is NEVER ok for an adult to tell a child to keep secrets from their parents or to express interest in a child's private parts.  Effects of second hand smoke  Avoid direct sunlight, sun protective clothing, sunscreen  Importance of detecting school issues ASAP as school failure has significant neg effect on children's self esteem and confidence   Importance of caring/supportive relationships with family and friends  Importance of reporting bullying, stalking, abuse, and any threat to one's safety ASAP  Importance of appropriate sleep amount and sleep hygiene (this age group should get 
Simple: Patient demonstrates quick and easy understanding/Verbalized Understanding

## 2024-03-21 ENCOUNTER — NON-APPOINTMENT (OUTPATIENT)
Age: 89
End: 2024-03-21

## 2024-03-22 ENCOUNTER — TRANSCRIPTION ENCOUNTER (OUTPATIENT)
Age: 89
End: 2024-03-22

## 2024-03-22 ENCOUNTER — APPOINTMENT (OUTPATIENT)
Dept: NEUROSURGERY | Facility: CLINIC | Age: 89
End: 2024-03-22
Payer: MEDICARE

## 2024-03-22 VITALS
SYSTOLIC BLOOD PRESSURE: 118 MMHG | BODY MASS INDEX: 19.83 KG/M2 | DIASTOLIC BLOOD PRESSURE: 65 MMHG | HEIGHT: 61 IN | TEMPERATURE: 97.6 F | WEIGHT: 105 LBS | HEART RATE: 68 BPM | OXYGEN SATURATION: 97 %

## 2024-03-22 DIAGNOSIS — Z86.79 PERSONAL HISTORY OF OTHER DISEASES OF THE CIRCULATORY SYSTEM: ICD-10-CM

## 2024-03-22 DIAGNOSIS — I71.40 ABDOMINAL AORTIC ANEURYSM, WITHOUT RUPTURE, UNSPECIFIED: ICD-10-CM

## 2024-03-22 DIAGNOSIS — I67.1 CEREBRAL ANEURYSM, NONRUPTURED: ICD-10-CM

## 2024-03-22 DIAGNOSIS — Z86.39 PERSONAL HISTORY OF OTHER ENDOCRINE, NUTRITIONAL AND METABOLIC DISEASE: ICD-10-CM

## 2024-03-22 DIAGNOSIS — Z78.9 OTHER SPECIFIED HEALTH STATUS: ICD-10-CM

## 2024-03-22 DIAGNOSIS — F17.200 NICOTINE DEPENDENCE, UNSPECIFIED, UNCOMPLICATED: ICD-10-CM

## 2024-03-22 PROCEDURE — 99205 OFFICE O/P NEW HI 60 MIN: CPT

## 2024-03-27 PROBLEM — I71.40 AAA (ABDOMINAL AORTIC ANEURYSM): Status: ACTIVE | Noted: 2024-03-27

## 2024-03-27 PROBLEM — Z78.9 NON-SMOKER: Status: RESOLVED | Noted: 2024-03-27 | Resolved: 2024-03-27

## 2024-03-27 PROBLEM — I67.1 UNRUPTURED CEREBRAL ANEURYSM: Status: ACTIVE | Noted: 2024-03-27

## 2024-03-27 PROBLEM — Z86.79 HISTORY OF HYPERTENSION: Status: RESOLVED | Noted: 2024-03-27 | Resolved: 2024-03-27

## 2024-03-27 PROBLEM — Z86.39 HISTORY OF HYPERLIPIDEMIA: Status: RESOLVED | Noted: 2024-03-27 | Resolved: 2024-03-27

## 2024-03-27 PROBLEM — F17.200 CURRENT EVERY DAY SMOKER: Status: ACTIVE | Noted: 2024-03-27

## 2024-03-27 RX ORDER — NEBIVOLOL 10 MG/1
10 TABLET ORAL
Refills: 0 | Status: ACTIVE | COMMUNITY

## 2024-03-27 RX ORDER — ATORVASTATIN CALCIUM 40 MG/1
40 TABLET, FILM COATED ORAL
Refills: 0 | Status: ACTIVE | COMMUNITY

## 2024-03-27 RX ORDER — VIT A/VIT C/VIT E/ZINC/COPPER 2148-113
TABLET ORAL
Refills: 0 | Status: ACTIVE | COMMUNITY

## 2024-03-27 RX ORDER — CALCIUM CARBONATE/VITAMIN D3 600 MG-20
TABLET,CHEWABLE ORAL
Refills: 0 | Status: ACTIVE | COMMUNITY

## 2024-03-27 RX ORDER — UBIDECARENONE 100 MG
100 CAPSULE ORAL
Refills: 0 | Status: ACTIVE | COMMUNITY

## 2024-03-27 RX ORDER — ASPIRIN 81 MG
81 TABLET, DELAYED RELEASE (ENTERIC COATED) ORAL
Refills: 0 | Status: ACTIVE | COMMUNITY

## 2024-03-27 RX ORDER — AMLODIPINE BESYLATE 10 MG/1
10 TABLET ORAL
Refills: 0 | Status: ACTIVE | COMMUNITY

## 2024-03-27 RX ORDER — HYDROCHLOROTHIAZIDE 12.5 MG/1
12.5 CAPSULE ORAL
Refills: 0 | Status: ACTIVE | COMMUNITY

## 2024-03-27 NOTE — ASSESSMENT
[FreeTextEntry1] : IMPRESSION: 88 year old female who presents for a new patient visit.    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 There is also a 3 mm aneurysm involving the right MCA bifurcation. 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 There is also a suspected 3 mm posteriorly directed aneurysm involving the left MCA M1 segment.   PLAN: 1. Repeat mra brain w/wo in 1 year 2/2025 2. F/U after imaging. 3. Advised patient to maintain a healthy lifestyle and good blood pressure control.

## 2024-03-27 NOTE — HISTORY OF PRESENT ILLNESS
[de-identified] : Lisbeth Rolon is a pleasant 88-year-old female who presents for a new patient visit.  Pt is a current every day smoker and + smoking history x 70 years.  Pt had imaging at ER visit for c/o low energy and weakness.  Per daughter, symptoms are possibly due to depression.  Pt also has a AAA that is being monitored by Cardiologist at [facility].  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 There is also a 3 mm aneurysm involving the right MCA bifurcation. 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 There is also a suspected 3 mm posteriorly directed aneurysm involving the left MCA M1 segment.

## 2024-03-27 NOTE — PHYSICAL EXAM
[General Appearance - Well Nourished] : well nourished [General Appearance - Alert] : alert [General Appearance - Well-Appearing] : healthy appearing [Oriented To Time, Place, And Person] : oriented to person, place, and time [Person] : oriented to person [Place] : oriented to place [Time] : oriented to time [Sensation Tactile Decrease] : light touch was intact [Sclera] : the sclera and conjunctiva were normal [Neck Appearance] : the appearance of the neck was normal [Outer Ear] : the ears and nose were normal in appearance [Respiration, Rhythm And Depth] : normal respiratory rhythm and effort [] : no respiratory distress [Exaggerated Use Of Accessory Muscles For Inspiration] : no accessory muscle use [Abnormal Walk] : normal gait [Heart Rate And Rhythm] : heart rate was normal and rhythm regular [Skin Color & Pigmentation] : normal skin color and pigmentation

## 2024-08-20 ENCOUNTER — INPATIENT (INPATIENT)
Facility: HOSPITAL | Age: 89
LOS: 1 days | Discharge: HOME | DRG: 66 | End: 2024-08-22
Attending: INTERNAL MEDICINE | Admitting: HOSPITALIST
Payer: MEDICARE

## 2024-08-20 VITALS
OXYGEN SATURATION: 97 % | RESPIRATION RATE: 20 BRPM | DIASTOLIC BLOOD PRESSURE: 77 MMHG | WEIGHT: 104.94 LBS | TEMPERATURE: 98 F | HEART RATE: 70 BPM | SYSTOLIC BLOOD PRESSURE: 146 MMHG | HEIGHT: 61 IN

## 2024-08-20 DIAGNOSIS — Z98.89 OTHER SPECIFIED POSTPROCEDURAL STATES: Chronic | ICD-10-CM

## 2024-08-20 DIAGNOSIS — Z98.49 CATARACT EXTRACTION STATUS, UNSPECIFIED EYE: Chronic | ICD-10-CM

## 2024-08-20 DIAGNOSIS — S62.102A FRACTURE OF UNSPECIFIED CARPAL BONE, LEFT WRIST, INITIAL ENCOUNTER FOR CLOSED FRACTURE: Chronic | ICD-10-CM

## 2024-08-20 DIAGNOSIS — Z90.10 ACQUIRED ABSENCE OF UNSPECIFIED BREAST AND NIPPLE: Chronic | ICD-10-CM

## 2024-08-20 LAB
ALBUMIN SERPL ELPH-MCNC: 4.5 G/DL — SIGNIFICANT CHANGE UP (ref 3.3–5)
ALP SERPL-CCNC: 82 U/L — SIGNIFICANT CHANGE UP (ref 40–120)
ALT FLD-CCNC: 17 U/L — SIGNIFICANT CHANGE UP (ref 10–45)
ANION GAP SERPL CALC-SCNC: 19 MMOL/L — HIGH (ref 5–17)
APPEARANCE UR: CLEAR — SIGNIFICANT CHANGE UP
AST SERPL-CCNC: 24 U/L — SIGNIFICANT CHANGE UP (ref 10–40)
BACTERIA # UR AUTO: NEGATIVE /HPF — SIGNIFICANT CHANGE UP
BASOPHILS # BLD AUTO: 0.02 K/UL — SIGNIFICANT CHANGE UP (ref 0–0.2)
BASOPHILS NFR BLD AUTO: 0.2 % — SIGNIFICANT CHANGE UP (ref 0–2)
BILIRUB SERPL-MCNC: 0.4 MG/DL — SIGNIFICANT CHANGE UP (ref 0.2–1.2)
BILIRUB UR-MCNC: NEGATIVE — SIGNIFICANT CHANGE UP
BUN SERPL-MCNC: 15 MG/DL — SIGNIFICANT CHANGE UP (ref 7–23)
CALCIUM SERPL-MCNC: 9.5 MG/DL — SIGNIFICANT CHANGE UP (ref 8.4–10.5)
CAST: 0 /LPF — SIGNIFICANT CHANGE UP (ref 0–4)
CHLORIDE SERPL-SCNC: 93 MMOL/L — LOW (ref 96–108)
CO2 SERPL-SCNC: 20 MMOL/L — LOW (ref 22–31)
COLOR SPEC: YELLOW — SIGNIFICANT CHANGE UP
CREAT SERPL-MCNC: 0.62 MG/DL — SIGNIFICANT CHANGE UP (ref 0.5–1.3)
DIFF PNL FLD: NEGATIVE — SIGNIFICANT CHANGE UP
EGFR: 86 ML/MIN/1.73M2 — SIGNIFICANT CHANGE UP
EOSINOPHIL # BLD AUTO: 0.07 K/UL — SIGNIFICANT CHANGE UP (ref 0–0.5)
EOSINOPHIL NFR BLD AUTO: 0.7 % — SIGNIFICANT CHANGE UP (ref 0–6)
GLUCOSE SERPL-MCNC: 115 MG/DL — HIGH (ref 70–99)
GLUCOSE UR QL: NEGATIVE MG/DL — SIGNIFICANT CHANGE UP
HCT VFR BLD CALC: 37.1 % — SIGNIFICANT CHANGE UP (ref 34.5–45)
HGB BLD-MCNC: 12.4 G/DL — SIGNIFICANT CHANGE UP (ref 11.5–15.5)
IMM GRANULOCYTES NFR BLD AUTO: 0.3 % — SIGNIFICANT CHANGE UP (ref 0–0.9)
KETONES UR-MCNC: NEGATIVE MG/DL — SIGNIFICANT CHANGE UP
LEUKOCYTE ESTERASE UR-ACNC: NEGATIVE — SIGNIFICANT CHANGE UP
LYMPHOCYTES # BLD AUTO: 2.16 K/UL — SIGNIFICANT CHANGE UP (ref 1–3.3)
LYMPHOCYTES # BLD AUTO: 20.8 % — SIGNIFICANT CHANGE UP (ref 13–44)
MCHC RBC-ENTMCNC: 30.1 PG — SIGNIFICANT CHANGE UP (ref 27–34)
MCHC RBC-ENTMCNC: 33.4 GM/DL — SIGNIFICANT CHANGE UP (ref 32–36)
MCV RBC AUTO: 90 FL — SIGNIFICANT CHANGE UP (ref 80–100)
MONOCYTES # BLD AUTO: 0.51 K/UL — SIGNIFICANT CHANGE UP (ref 0–0.9)
MONOCYTES NFR BLD AUTO: 4.9 % — SIGNIFICANT CHANGE UP (ref 2–14)
NEUTROPHILS # BLD AUTO: 7.61 K/UL — HIGH (ref 1.8–7.4)
NEUTROPHILS NFR BLD AUTO: 73.1 % — SIGNIFICANT CHANGE UP (ref 43–77)
NITRITE UR-MCNC: NEGATIVE — SIGNIFICANT CHANGE UP
NRBC # BLD: 0 /100 WBCS — SIGNIFICANT CHANGE UP (ref 0–0)
PH UR: 7 — SIGNIFICANT CHANGE UP (ref 5–8)
PLATELET # BLD AUTO: 149 K/UL — LOW (ref 150–400)
POTASSIUM SERPL-MCNC: 3.7 MMOL/L — SIGNIFICANT CHANGE UP (ref 3.5–5.3)
POTASSIUM SERPL-SCNC: 3.7 MMOL/L — SIGNIFICANT CHANGE UP (ref 3.5–5.3)
PROT SERPL-MCNC: 7.6 G/DL — SIGNIFICANT CHANGE UP (ref 6–8.3)
PROT UR-MCNC: NEGATIVE MG/DL — SIGNIFICANT CHANGE UP
RBC # BLD: 4.12 M/UL — SIGNIFICANT CHANGE UP (ref 3.8–5.2)
RBC # FLD: 13.4 % — SIGNIFICANT CHANGE UP (ref 10.3–14.5)
RBC CASTS # UR COMP ASSIST: 2 /HPF — SIGNIFICANT CHANGE UP (ref 0–4)
SODIUM SERPL-SCNC: 132 MMOL/L — LOW (ref 135–145)
SP GR SPEC: 1 — SIGNIFICANT CHANGE UP (ref 1–1.03)
SQUAMOUS # UR AUTO: 0 /HPF — SIGNIFICANT CHANGE UP (ref 0–5)
TROPONIN T, HIGH SENSITIVITY RESULT: 17 NG/L — SIGNIFICANT CHANGE UP (ref 0–51)
UROBILINOGEN FLD QL: 0.2 MG/DL — SIGNIFICANT CHANGE UP (ref 0.2–1)
WBC # BLD: 10.4 K/UL — SIGNIFICANT CHANGE UP (ref 3.8–10.5)
WBC # FLD AUTO: 10.4 K/UL — SIGNIFICANT CHANGE UP (ref 3.8–10.5)
WBC UR QL: 0 /HPF — SIGNIFICANT CHANGE UP (ref 0–5)

## 2024-08-20 PROCEDURE — 76937 US GUIDE VASCULAR ACCESS: CPT | Mod: 26

## 2024-08-20 PROCEDURE — 70450 CT HEAD/BRAIN W/O DYE: CPT | Mod: 26,59,MC

## 2024-08-20 PROCEDURE — 70498 CT ANGIOGRAPHY NECK: CPT | Mod: 26,MC

## 2024-08-20 PROCEDURE — 99285 EMERGENCY DEPT VISIT HI MDM: CPT | Mod: FS

## 2024-08-20 PROCEDURE — 71045 X-RAY EXAM CHEST 1 VIEW: CPT | Mod: 26

## 2024-08-20 PROCEDURE — 70496 CT ANGIOGRAPHY HEAD: CPT | Mod: 26,MC

## 2024-08-20 RX ADMIN — Medication 1000 MILLILITER(S): at 20:02

## 2024-08-20 NOTE — ED ADULT NURSE NOTE - OBJECTIVE STATEMENT
88 y/o F with PMHx of AAA, cerebral aneurysms, HTN, HLD presents to the ED with complaints of dizziness and lightheadedness x 1 day. Patient notes sxs are worsened with movement and described as lightheadedness. Patient notes similar sxs in the past. Denies associated chest pain, shortness of breath, palpitations, extremity numbness/weakness, vision changes, LOC, or changes with head movement. Reports compliance with all medications. AOx4 and speaking coherently. Breathing is unlabored, spontaneous, and symmetrical. Abdomen is soft, nondistended, and nontender. No bladder distention. No peripheral edema noted. <2s capillary refill. Ambulatory with full ROM of all extremities.

## 2024-08-20 NOTE — ED PROVIDER NOTE - OBJECTIVE STATEMENT
89-year-old female history of stable AAA being monitored, cerebral aneurysms, HTN, HLD presents with dizziness with lightheadedness since the middle of the night yesterday and throughout the day.  Symptoms are worse when walking around and better when resting.  Denies worse with head movement.  Denies vertigo sensation reporting she has had this in the past, reports this is more of a lightheadedness.  Reports adequate hydration and p.o. intake.  Denies associated chest pain, shortness of breath, palpitations, extremity numbness/weakness, vision changes, LOC.  Recent ED/ED workup within the last year with echo demonstrating grade 1 diastolic dysfunction, stress test within normal limits, occasional ventricular bigeminy on telemetry while asymptomatic.  Reports compliance with all medications.

## 2024-08-20 NOTE — ED PROVIDER NOTE - NSICDXFAMILYHX_GEN_ALL_CORE_FT
FAMILY HISTORY:  Sibling  Still living? Unknown  Family history of diabetes mellitus (DM), Age at diagnosis: Age Unknown  Family history of heart disease, Age at diagnosis: Age Unknown  FH: heart disease, Age at diagnosis: Age Unknown

## 2024-08-20 NOTE — ED PROVIDER NOTE - ATTENDING APP SHARED VISIT CONTRIBUTION OF CARE
89-year-old female history of brain aneurysms that they are just watching presenting to the emergency department with intermittent dizziness lightheadedness over the past several days.  Patient denies any trauma any fevers or chills no chest pain palpitations no headaches.  Patient currently asymptomatic is having similar presentation in January which led her to the CDU stay with normal echo and telemetry tracing at that time.  Has a nonfocal neuroexam today well-appearing IV fluids lightheaded but in light of her cerebral aneurysm history he had CTA was ordered by the [facility].

## 2024-08-20 NOTE — ED PROVIDER NOTE - RAPID ASSESSMENT
89-year-old female history of brain aneurysms that they are just watching presenting to the emergency department with intermittent dizziness lightheadedness over the past several days.  Patient wax and wanes between the 2 complaints.  No focal deficit no numbness no weakness no visual changes no nausea no vomiting no chest pain palpitations or shortness of breath no fevers no chills  Patient was rapidly assessed via a telemedicine and/or role of Quick Triage Doctor; a limited history, physical exam and assessment was performed. The patient will be seen and further evaluated in the main emergency department. The remainder of care and evaluation will be conducted by the primary emergency medicine team. Receiving team will follow up on labs, imaging and serially reassess patient as indicated. All further decisions regarding patient care, evaluation and disposition are at the discretion of the receiving primary emergency department team.

## 2024-08-20 NOTE — ED PROVIDER NOTE - NSICDXPASTMEDICALHX_GEN_ALL_CORE_FT
PAST MEDICAL HISTORY:  AAA (abdominal aortic aneurysm) stable    Breast CA     Chronic obstructive pulmonary disease     HLD (hyperlipidemia)     HTN (hypertension)     Hyperlipidemia     Hypertension     Macular degeneration     Nicotine dependence     Osteoporosis     Seasonal allergies     TB (tuberculosis) as a child

## 2024-08-20 NOTE — ED PROVIDER NOTE - NS ED ATTENDING STATEMENT MOD
Attending with This was a shared visit with the GUTIERREZ. I reviewed and verified the documentation.

## 2024-08-20 NOTE — ED PROVIDER NOTE - PROGRESS NOTE DETAILS
spoke w/radiology reports small SAH w/known cerebral aneurysms similar size from previous. Attending aware. Patient aware. neurosurgery paged - Edwin Rider PA-C spoke w/neurosurgery team, unclear if bleed vs artifact, recommends obtaining 4hr rpt interval cth.pt stable - Edwin Rider PA-C Neurosurgery recommends 24-hour interval CT head.  Patient reports remained symptomatic with reported dizziness/feels off balance with walking requiring assistance holding onto objects which is not typical for her baseline.  Patient is agreeable to admission for repeat CT head, PT evaluation. - Edwin Rider PA-C

## 2024-08-20 NOTE — ED ADULT NURSE NOTE - TEMPLATE LIST FOR HEAD TO TOE ASSESSMENT
Called patient who was yelling stating she did not get her prep letter nor knows the time of her arrival for her procedures.  She said that her daughter needs to know time so she can arrange her work schedule.  If she doesn't find out today, she said she will cancel the procedures.  Discussed with her that a message will be sent to the surgical center asking them to call her today (message sent).      Reviewed with patient that the prep letter instructions were mailed to her on 1/3/2022 and confirmed her address.  When asked if she received the letter she said yes.  She then said she doesn't have the prep yet because she doesn't know what it is.  Reviewed with her that in the letter it states everything she needs is over the counter and again asked her if she had it--she said no, \"I've been moving around a lot.  Who knows where it is\".  Again confirmed her address listed in EPIC and she said that is her correct address. She began yelling that she doesn't have the letter and she can't buy anything because she has no money.  Offered to change prep to a prescription prep (Split dose Nulytely with dulcolax) and she said that she wanted a prescription.  New prep letter mailed to patient and RX sent to pharmacy.  
Left message for patient to call back.      Patient was sent a colonoscopy prep letter on 1/3/2022.  
Patient calling and is upset that she has not been sent any info on her upcoming procedure with Dr. White.  She says she doesn't like the way they do business and tried calling last week with no response.  Jemima states that if no one gets in touch with her, she will not be going through with the procedure.  Patient is requesting a return call and can be reached today at [phone].  
Pt returning call, [phone].  
Neuro

## 2024-08-20 NOTE — ED PROVIDER NOTE - ATTENDING CONTRIBUTION TO CARE
89-year-old female history of brain aneurysms that they are just watching presenting to the emergency department with intermittent dizziness lightheadedness over the past several days.  Patient denies any trauma any fevers or chills no chest pain palpitations no headaches.  Patient currently asymptomatic is having similar presentation in January which led her to the CDU stay with normal echo and telemetry tracing at that time.  Has a nonfocal neuroexam today well-appearing IV fluids lightheaded but in light of her cerebral aneurysm history he had CTA was ordered by the \[facility]\"".

## 2024-08-20 NOTE — ED PROVIDER NOTE - NSFOLLOWUPINSTRUCTIONS_ED_ALL_ED_FT
Please follow up with your primary care doctor in 1-3 days.  *Bring all printed lab/test results to your appointment(s).*    Stay well hydrated with water and electrolyte replacement solutions such as Pedialyte or the adult equivalent.    Continue all home medications    Return for new or worsening symptoms

## 2024-08-20 NOTE — ED PROVIDER NOTE - NSICDXPASTSURGICALHX_GEN_ALL_CORE_FT
PAST SURGICAL HISTORY:  H/O left breast biopsy 6/2014    H/O mastectomy     History of cataract extraction bilateral    Left wrist fracture s/p ORIF dec 2013    Port-a-cath in place [place] 8/4/2014

## 2024-08-21 DIAGNOSIS — Z29.9 ENCOUNTER FOR PROPHYLACTIC MEASURES, UNSPECIFIED: ICD-10-CM

## 2024-08-21 DIAGNOSIS — I67.1 CEREBRAL ANEURYSM, NONRUPTURED: ICD-10-CM

## 2024-08-21 DIAGNOSIS — Z79.899 OTHER LONG TERM (CURRENT) DRUG THERAPY: ICD-10-CM

## 2024-08-21 DIAGNOSIS — I60.9 NONTRAUMATIC SUBARACHNOID HEMORRHAGE, UNSPECIFIED: ICD-10-CM

## 2024-08-21 DIAGNOSIS — I71.40 ABDOMINAL AORTIC ANEURYSM, WITHOUT RUPTURE, UNSPECIFIED: ICD-10-CM

## 2024-08-21 DIAGNOSIS — R42 DIZZINESS AND GIDDINESS: ICD-10-CM

## 2024-08-21 PROCEDURE — 70450 CT HEAD/BRAIN W/O DYE: CPT | Mod: 26,MC,77

## 2024-08-21 PROCEDURE — 70450 CT HEAD/BRAIN W/O DYE: CPT | Mod: 26

## 2024-08-21 PROCEDURE — 99223 1ST HOSP IP/OBS HIGH 75: CPT

## 2024-08-21 RX ORDER — ACETAMINOPHEN 325 MG/1
650 TABLET ORAL EVERY 6 HOURS
Refills: 0 | Status: DISCONTINUED | OUTPATIENT
Start: 2024-08-21 | End: 2024-08-22

## 2024-08-21 RX ORDER — SODIUM CHLORIDE 9 MG/ML
1000 INJECTION INTRAMUSCULAR; INTRAVENOUS; SUBCUTANEOUS
Refills: 0 | Status: DISCONTINUED | OUTPATIENT
Start: 2024-08-21 | End: 2024-08-22

## 2024-08-21 RX ADMIN — Medication 1000 MILLILITER(S): at 02:53

## 2024-08-21 RX ADMIN — Medication 40 MILLIGRAM(S): at 23:31

## 2024-08-21 RX ADMIN — SODIUM CHLORIDE 75 MILLILITER(S): 9 INJECTION INTRAMUSCULAR; INTRAVENOUS; SUBCUTANEOUS at 13:35

## 2024-08-21 NOTE — H&P ADULT - PROBLEM SELECTOR PLAN 1
- Tiny hyperdensity in the left medial occipital lobe likely representing acute subarachnoid blood  - Stable on repeat 4hr CTH  - f/u 24hr repeat CTH  - NSGY evaluated w/ no intervention at this time  - Holding ASA for now

## 2024-08-21 NOTE — H&P ADULT - NSHPLABSRESULTS_GEN_ALL_CORE
LABS;                      12.4   10.40 )-----------( 149      ( 20 Aug 2024 18:22 )             37.1     08-20    132<L>  |  93<L>  |  15  ----------------------------<  115<H>  3.7   |  20<L>  |  0.62    Ca    9.5      20 Aug 2024 18:22    TPro  7.6  /  Alb  4.5  /  TBili  0.4  /  DBili  x   /  AST  24  /  ALT  17  /  AlkPhos  82  08-20    LIVER FUNCTIONS - ( 20 Aug 2024 18:22 )  Alb: 4.5 g/dL / Pro: 7.6 g/dL / ALK PHOS: 82 U/L / ALT: 17 U/L / AST: 24 U/L / GGT: x           Urinalysis Basic - ( 20 Aug 2024 21:18 )  Color: Yellow / Appearance: Clear / S.005 / pH: x  Gluc: x / Ketone: Negative mg/dL  / Bili: Negative / Urobili: 0.2 mg/dL   Blood: x / Protein: Negative mg/dL / Nitrite: Negative   Leuk Esterase: Negative / RBC: 2 /HPF / WBC 0 /HPF   Sq Epi: x / Non Sq Epi: 0 /HPF / Bacteria: Negative /HPF    IMAGING:  CT Angio Head w/ IV Cont (24 @ 21:17)  IMPRESSION:  CT HEAD:  - Tiny hyperdensity in the left medial occipital lobe likely representing acute subarachnoid blood. No mass effect, midline shift, or herniation.  - Age-related involutional changes and chronic small vessel ischemic disease.    CTA NECK:  - No evidence of significant stenosis or occlusion.    CTA HEAD:  - Multiple aneurysms of the [place], without significant change when compared with MRI brain study of 2024, as detailed above.  - No large vessel occlusion or flow-limiting stenosis.    CT Head No Cont (24 @ 01:25)  IMPRESSION:  - Stable exam.

## 2024-08-21 NOTE — PHYSICAL THERAPY INITIAL EVALUATION ADULT - PERTINENT HX OF CURRENT PROBLEM, REHAB EVAL
Pt is an 89 year old female with PMH significant for HTN, HLD, cerebral aneurysms, stable AAA.  Pt presents with dizziness x1 day. Reports dizziness sensation in the middle of the night last night when she woke up to use the restroom. Reports symptoms persisted and worse when walking and better w/ rest and more of a lightheaded sensation. Denies sensation of room spinning or herself spinning and no ringing in the ears. In the ED VSS w/ labs non-actionable and ECG w/ NSR w/ left axis deviation and CT w/ tiny acute SAH and stable aneurysm. SAH stable on repeat 4 hr exam.  Per neurosurgery, no acute neurosurgical intervention.  CTH(8/20): CT HEAD: Tiny hyperdensity in the left medial occipital lobe likely representing   acute subarachnoid blood. No mass effect, midline shift, or herniation. Age-related involutional changes and chronic small vessel ischemic disease. CTA NECK: No evidence of significant stenosis or occlusion. CTA HEAD: Multiple aneurysms of the [place], without significant change when compared with MRI brain study of 2/17/2024, as detailed above. No large vessel occlusion or flow-limiting stenosis.  CXR(8/20): Hyperaerated lungs with apical scarring. No focal consolidation  Repeat CTH(8/21): Stable exam.

## 2024-08-21 NOTE — H&P ADULT - NSHPPHYSICALEXAM_GEN_ALL_CORE
Vital Signs Last 24 Hrs  T(C): 36.7 (21 Aug 2024 05:35), Max: 36.7 (21 Aug 2024 05:35)  T(F): 98 (21 Aug 2024 05:35), Max: 98 (21 Aug 2024 05:35)  HR: 69 (21 Aug 2024 05:35) (66 - 70)  BP: 123/72 (21 Aug 2024 05:35) (123/72 - 150/68)  BP(mean): 91 (21 Aug 2024 04:17) (91 - 97)  RR: 18 (21 Aug 2024 05:35) (18 - 20)  SpO2: 97% (21 Aug 2024 05:35) (96% - 99%)    Parameters below as of 21 Aug 2024 05:35  Patient On (Oxygen Delivery Method): room air    CONSTITUTIONAL: Well-groomed, in no apparent distress  EYES: No conjunctival or scleral injection, non-icteric;   ENMT: No external nasal lesions; MMM  NECK: Trachea midline without palpable neck mass; thyroid not enlarged and non-tender  RESPIRATORY: Breathing comfortably; no dullness to percussion; lungs CTA without wheeze/rhonchi/rales  CARDIOVASCULAR: +S1S2, RRR, no M/G/R; pedal pulses full and symmetric; no lower extremity edema  GASTROINTESTINAL: No palpable masses or tenderness, +BS throughout, no rebound/guarding; no hepatosplenomegaly; no hernia palpated  LYMPHATIC: No cervical LAD or tenderness  SKIN: No rashes or ulcers noted  NEUROLOGIC: CN II-XII intact; sensation intact in LEs b/l to light touch  PSYCHIATRIC: A&Ox3; mood and affect appropriate; appropriate insight and judgment

## 2024-08-21 NOTE — PHYSICAL THERAPY INITIAL EVALUATION ADULT - PLANNED THERAPY INTERVENTIONS, PT EVAL
Stairs: GOAL: Pt will ascend/descend 5 stairs independently using unilateral rail in order to return home safety in 2 weeks./gait training/strengthening

## 2024-08-21 NOTE — CONSULT NOTE ADULT - SUBJECTIVE AND OBJECTIVE BOX
p (1480)     89F on ASA81 for heart health, hx HTN, HLD, stable AAA, known intracraial aneurysms for which she follows w/ Dr. Murdock outpt and was recommended for observation w/ serial imaging p/f lightheadedness/dizziness. CTH read w/ tiny L occipital hyperdensity c/f acute heme. CTA h/n w/ no sig stenosis, multiple known aneurysms at R supraclinoid ICA, R MCA bifucation, L supraclinoid ICA aneurysm, and L M1 aneurysm which are all stable from her February 2024 scan. Exam: neurointact    --Anticoagulation:    =====================  PAST MEDICAL HISTORY   Breast CA    Hypertension    Hyperlipidemia    Osteoporosis    Seasonal allergies    AAA (abdominal aortic aneurysm)    TB (tuberculosis)    Chronic obstructive pulmonary disease    Nicotine dependence    Macular degeneration    HTN (hypertension)    HLD (hyperlipidemia)      PAST SURGICAL HISTORY   Left wrist fracture    Port-a-cath in place    H/O left breast biopsy    History of cataract extraction    S/P mastectomy    H/O mastectomy      lisinopril (Angioedema)      MEDICATIONS:  Antibiotics:    Neuro:    Other:      SOCIAL HISTORY:   Occupation:   Marital Status:     FAMILY HISTORY:  Family history of other condition    Family history of diabetes mellitus (DM) (Sibling)    Family history of heart disease (Sibling)    FH: heart disease (Sibling)        ROS: Negative except per HPI    LABS:                          12.4   10.40 )-----------( 149      ( 20 Aug 2024 18:22 )             37.1     08-20    132<L>  |  93<L>  |  15  ----------------------------<  115<H>  3.7   |  20<L>  |  0.62    Ca    9.5      20 Aug 2024 18:22    TPro  7.6  /  Alb  4.5  /  TBili  0.4  /  DBili  x   /  AST  24  /  ALT  17  /  AlkPhos  82  08-20

## 2024-08-21 NOTE — H&P ADULT - PROBLEM SELECTOR PLAN 2
- Will obtain orthostatics  - Med rec in AM to determine if any possible cause there  - s/p 1L IVF in the ED, encouraged PO hydration

## 2024-08-21 NOTE — CHART NOTE - NSCHARTNOTEFT_GEN_A_CORE
Patient seen and examined- H&P reviewed  SAH- await repeat CTH this evening to assess stability  Orthostatic- start gentle IVF, holding BP meds (on Norvasc, HCTZ, and Nebivolol at home)  PT hansel

## 2024-08-21 NOTE — CONSULT NOTE ADULT - ASSESSMENT
89F on ASA81 for heart health, hx HTN, HLD, stable AAA, known intracraial aneurysms for which she follows w/ Dr. Murdock outpt and was recommended for observation w/ serial imaging p/f lightheadedness/dizziness. CTH read w/ tiny L occipital hyperdensity c/f acute heme. CTA h/n w/ no sig stenosis, multiple known aneurysms at R supraclinoid ICA, R MCA bifucation, L supraclinoid ICA aneurysm, and L M1 aneurysm which are all stable from her February 2024 scan. Exam: neurointact  - no acute neurosurgical intervention  - adm med for dizziness w/u  - 4hr rpt scan  - hold AC/AP 89F on ASA81 for heart health, hx HTN, HLD, stable AAA, known intracraial aneurysms for which she follows w/ Dr. Murdock outpt and was recommended for observation w/ serial imaging p/f lightheadedness/dizziness. CTH read w/ tiny L occipital hyperdensity c/f acute heme. CTA h/n w/ no sig stenosis, multiple known aneurysms at R supraclinoid ICA, R MCA bifucation, L supraclinoid ICA aneurysm, and L M1 aneurysm which are all stable from her February 2024 scan. Exam: neurointact  - no acute neurosurgical intervention  - medicine consult for dizziness w/u  - 4hr rpt scan; if read as stable/improved, no neurosurgical c/i to d/c 89F on ASA81 for heart health, hx HTN, HLD, stable AAA, known intracraial aneurysms for which she follows w/ Dr. Murdock outpt and was recommended for observation w/ serial imaging p/f lightheadedness/dizziness. CTH read w/ tiny L occipital hyperdensity c/f acute heme. CTA h/n w/ no sig stenosis, multiple known aneurysms at R supraclinoid ICA, R MCA bifucation, L supraclinoid ICA aneurysm, and L M1 aneurysm which are all stable from her February 2024 scan. Exam: neurointact  - no acute neurosurgical intervention  - medicine consult for dizziness w/u  - 4hr rpt scan stable  - 24hr CTH given ASA use

## 2024-08-21 NOTE — H&P ADULT - HISTORY OF PRESENT ILLNESS
Pt is an 89yo F w/ PMH of HTN, HLD, cerebral aneurysms, stable AAA pw dizziness x1 day.    Reports dizziness sensation in the middle of the night last night when she woke up to use the restroom. Reports symptoms persisted and worse when walking and better w/ rest and more of a lightheaded sensation. Denies sensation of room spinning or herself spinning and no ringing in the ears.     Reports compliance w/ medications though does not have list w/ her at this moment as it is with the daughter who will be bedside in AM.     Denies CP, SOB, palpitations, abd pain, N/V, constipation or diarrhea.     In the ED VSS w/ labs non-actionable and ECG w/ NSR w/ left axis deviation and CT w/ tiny acute SAH and stable aneurysm.

## 2024-08-21 NOTE — H&P ADULT - ASSESSMENT
89yo F w/ PMH of HTN, HLD, cerebral aneurysms, stable AAA pw dizziness x1 day found to have small acute SAH, stable on repeat 4 hr exam

## 2024-08-21 NOTE — H&P ADULT - PROBLEM SELECTOR PROBLEM 3
[facility]   Hospitalist Progress Note  Date: 10/5/2022  Patient Name: Mary Julian  : 1954  MRN: [id number]  Date of admission: 10/4/Cerebral aneurysm

## 2024-08-22 ENCOUNTER — TRANSCRIPTION ENCOUNTER (OUTPATIENT)
Age: 89
End: 2024-08-22

## 2024-08-22 VITALS
HEART RATE: 71 BPM | OXYGEN SATURATION: 99 % | DIASTOLIC BLOOD PRESSURE: 78 MMHG | SYSTOLIC BLOOD PRESSURE: 132 MMHG | RESPIRATION RATE: 18 BRPM | TEMPERATURE: 98 F

## 2024-08-22 DIAGNOSIS — R42 DIZZINESS AND GIDDINESS: ICD-10-CM

## 2024-08-22 LAB
ANION GAP SERPL CALC-SCNC: 11 MMOL/L — SIGNIFICANT CHANGE UP (ref 5–17)
BUN SERPL-MCNC: 11 MG/DL — SIGNIFICANT CHANGE UP (ref 7–23)
CALCIUM SERPL-MCNC: 7.3 MG/DL — LOW (ref 8.4–10.5)
CHLORIDE SERPL-SCNC: 106 MMOL/L — SIGNIFICANT CHANGE UP (ref 96–108)
CO2 SERPL-SCNC: 21 MMOL/L — LOW (ref 22–31)
CREAT SERPL-MCNC: 0.6 MG/DL — SIGNIFICANT CHANGE UP (ref 0.5–1.3)
CULTURE RESULTS: SIGNIFICANT CHANGE UP
EGFR: 86 ML/MIN/1.73M2 — SIGNIFICANT CHANGE UP
GLUCOSE SERPL-MCNC: 84 MG/DL — SIGNIFICANT CHANGE UP (ref 70–99)
HCT VFR BLD CALC: 30.1 % — LOW (ref 34.5–45)
HCT VFR BLD CALC: 39 % — SIGNIFICANT CHANGE UP (ref 34.5–45)
HGB BLD-MCNC: 12.8 G/DL — SIGNIFICANT CHANGE UP (ref 11.5–15.5)
HGB BLD-MCNC: 9.9 G/DL — LOW (ref 11.5–15.5)
MCHC RBC-ENTMCNC: 30 PG — SIGNIFICANT CHANGE UP (ref 27–34)
MCHC RBC-ENTMCNC: 30.5 PG — SIGNIFICANT CHANGE UP (ref 27–34)
MCHC RBC-ENTMCNC: 32.8 GM/DL — SIGNIFICANT CHANGE UP (ref 32–36)
MCHC RBC-ENTMCNC: 32.9 GM/DL — SIGNIFICANT CHANGE UP (ref 32–36)
MCV RBC AUTO: 91.3 FL — SIGNIFICANT CHANGE UP (ref 80–100)
MCV RBC AUTO: 92.6 FL — SIGNIFICANT CHANGE UP (ref 80–100)
NRBC # BLD: 0 /100 WBCS — SIGNIFICANT CHANGE UP (ref 0–0)
NRBC # BLD: 0 /100 WBCS — SIGNIFICANT CHANGE UP (ref 0–0)
PLATELET # BLD AUTO: 149 K/UL — LOW (ref 150–400)
PLATELET # BLD AUTO: 198 K/UL — SIGNIFICANT CHANGE UP (ref 150–400)
POTASSIUM SERPL-MCNC: 3.1 MMOL/L — LOW (ref 3.5–5.3)
POTASSIUM SERPL-MCNC: 4.7 MMOL/L — SIGNIFICANT CHANGE UP (ref 3.5–5.3)
POTASSIUM SERPL-SCNC: 3.1 MMOL/L — LOW (ref 3.5–5.3)
POTASSIUM SERPL-SCNC: 4.7 MMOL/L — SIGNIFICANT CHANGE UP (ref 3.5–5.3)
RBC # BLD: 3.25 M/UL — LOW (ref 3.8–5.2)
RBC # BLD: 4.27 M/UL — SIGNIFICANT CHANGE UP (ref 3.8–5.2)
RBC # FLD: 13.8 % — SIGNIFICANT CHANGE UP (ref 10.3–14.5)
RBC # FLD: 13.9 % — SIGNIFICANT CHANGE UP (ref 10.3–14.5)
SODIUM SERPL-SCNC: 138 MMOL/L — SIGNIFICANT CHANGE UP (ref 135–145)
SPECIMEN SOURCE: SIGNIFICANT CHANGE UP
WBC # BLD: 5.59 K/UL — SIGNIFICANT CHANGE UP (ref 3.8–10.5)
WBC # BLD: 9.64 K/UL — SIGNIFICANT CHANGE UP (ref 3.8–10.5)
WBC # FLD AUTO: 5.59 K/UL — SIGNIFICANT CHANGE UP (ref 3.8–10.5)
WBC # FLD AUTO: 9.64 K/UL — SIGNIFICANT CHANGE UP (ref 3.8–10.5)

## 2024-08-22 PROCEDURE — 99239 HOSP IP/OBS DSCHRG MGMT >30: CPT

## 2024-08-22 RX ORDER — POTASSIUM CHLORIDE 10 MEQ
40 TABLET, EXT RELEASE, PARTICLES/CRYSTALS ORAL ONCE
Refills: 0 | Status: COMPLETED | OUTPATIENT
Start: 2024-08-22 | End: 2024-08-22

## 2024-08-22 RX ADMIN — Medication 40 MILLIEQUIVALENT(S): at 11:43

## 2024-08-22 NOTE — DISCHARGE NOTE PROVIDER - NSDCCPCAREPLAN_GEN_ALL_CORE_FT
PRINCIPAL DISCHARGE DIAGNOSIS  Diagnosis: Dizziness  Assessment and Plan of Treatment: You were found to have orthostatic hypotension, your blood pressure medications have been stopped, you were given intravenous fluids and your blood pressure is stable. Your orthostatic hypotension was rechecked after interventions and it has resolved. Please remain off of Bystolic, Aspirin and hydrochlorothiazide      SECONDARY DISCHARGE DIAGNOSES  Diagnosis: SAH (subarachnoid hemorrhage)  Assessment and Plan of Treatment: You presented with dizziness and were found to incidentially have a subarachnoid hemorrahage. On repeat CT head stable. You have no neurological deficits and can follow up as an outpatient with Dr. Murdock and remain off of Aspirin.

## 2024-08-22 NOTE — DISCHARGE NOTE PROVIDER - PROVIDER TOKENS
FREE:[LAST:[Victorina],FIRST:[Hsin],PHONE:[phone]:[(   )    -],ADDRESS:[[address] [place] [address]]],FREE:[LAST:[Denishai],FIRST:[Dorothyr],PHONE:[phone]],FAX:[(   )    -],ADDRESS:[[address] [place] [address]]]

## 2024-08-22 NOTE — DISCHARGE NOTE PROVIDER - NSDCMRMEDTOKEN_GEN_ALL_CORE_FT
aspirin 81 mg oral tablet: 1 tab(s) orally once a day  Bystolic 5 mg oral tablet: 1 tab(s) orally once a day  CoQ10: 200 milligram(s) orally once a day  ferrous sulfate:  orally   ferrous sulfate 325 mg (65 mg elemental iron) oral delayed release tablet: 1 tab(s) orally once a day  hydrochlorothiazide 12.5 mg oral capsule: 1 cap(s) orally once a day  simvastatin:  orally   simvastatin 20 mg oral tablet: 1 tab(s) orally once a day (at bedtime)  Vitamin D3 2000 intl units oral capsule: 1 cap(s) orally once a day   CoQ10: 200 milligram(s) orally once a day  ferrous sulfate 325 mg (65 mg elemental iron) oral delayed release tablet: 1 tab(s) orally once a day  simvastatin 20 mg oral tablet: 1 tab(s) orally once a day (at bedtime)  Vitamin D3 2000 intl units oral capsule: 1 cap(s) orally once a day

## 2024-08-22 NOTE — DISCHARGE NOTE NURSING/CASE MANAGEMENT/SOCIAL WORK - PATIENT PORTAL LINK FT
You can access the FollowMyHealth Patient Portal offered by [facility] by registering at the following website: [email or web address]. By joining [email or web address]’s FollowMyHealth portal, you will also be able to view your health information using other applications (apps) compatible with our system.

## 2024-08-22 NOTE — DISCHARGE NOTE PROVIDER - ATTENDING DISCHARGE PHYSICAL EXAMINATION:
GENERAL: NAD  HEAD:  Atraumatic, normocephalic  EYES: EOMI, PERRLA, conjunctiva and sclera clear  CHEST/LUNG: Clear to auscultation bilaterally; no wheezes  HEART: +S1+S2, regular rate and rhythm  ABDOMEN: Soft, nontender, nondistended; bowel sounds present  EXTREMITIES:  2+ peripheral pulses; no clubbing, cyanosis, or edema  PSYCH: AAOx3

## 2024-08-22 NOTE — DISCHARGE NOTE PROVIDER - HOSPITAL COURSE
HPI:  Pt is an 88yo F w/ PMH of HTN, HLD, cerebral aneurysms, stable AAA pw dizziness x1 day.    Reports dizziness sensation in the middle of the night last night when she woke up to use the restroom. Reports symptoms persisted and worse when walking and better w/ rest and more of a lightheaded sensation. Denies sensation of room spinning or herself spinning and no ringing in the ears.     Reports compliance w/ medications though does not have list w/ her at this moment as it is with the daughter who will be bedside in AM.     Denies CP, SOB, palpitations, abd pain, N/V, constipation or diarrhea.     In the ED VSS w/ labs non-actionable and ECG w/ NSR w/ left axis deviation and CT w/ tiny acute SAH and stable aneurysm.   (21 Aug 2024 05:25)    Hospital Course:      Important Medication Changes and Reason:    Active or Pending Issues Requiring Follow-up:    Advanced Directives:   [ ] Full code  [ ] DNR  [ ] Hospice    Discharge Diagnoses:         HPI:  Pt is an 89yo F w/ PMH of HTN, HLD, cerebral aneurysms, stable AAA pw dizziness x1 day.    Reports dizziness sensation in the middle of the night last night when she woke up to use the restroom. Reports symptoms persisted and worse when walking and better w/ rest and more of a lightheaded sensation. Denies sensation of room spinning or herself spinning and no ringing in the ears.     Reports compliance w/ medications though does not have list w/ her at this moment as it is with the daughter who will be bedside in AM.     Denies CP, SOB, palpitations, abd pain, N/V, constipation or diarrhea.     In the ED VSS w/ labs non-actionable and ECG w/ NSR w/ left axis deviation and CT w/ tiny acute SAH and stable aneurysm.   (21 Aug 2024 05:25)    Hospital Course:  89yo F w/ PMH of HTN, HLD, cerebral aneurysms, stable AAA pw dizziness x1 day. Reports dizziness sensation in the middle of the night last night when she woke up to use the restroom. Reports symptoms persisted and worse when walking and better w/ rest and more of a lightheaded sensation. In the ED VSS w/ labs non-actionable and ECG w/ NSR w/ left axis deviation and CT w/ tiny acute SAH and stable aneurysm and found to be orthostatic positive. BP meds stopped and IVF given. Repeat CTH done and stable. Orthostatics rechecked and now improved. BP stable will discharge off of blood pressure medications until follow up with PMD. Patient is stable for discharge home.       Important Medication Changes and Reason:  Stop Aspirin, Bystolic and Hydrochlorothiazide     Active or Pending Issues Requiring Follow-up:  Dr. Victorina Murdock    Advanced Directives:   [X] Full code  [ ] DNR  [ ] Hospice    Discharge Diagnoses:  Dizziness   SAH     HPI:  Pt is an 89yo F w/ PMH of HTN, HLD, cerebral aneurysms, stable AAA pw dizziness x1 day.    Reports dizziness sensation in the middle of the night last night when she woke up to use the restroom. Reports symptoms persisted and worse when walking and better w/ rest and more of a lightheaded sensation. Denies sensation of room spinning or herself spinning and no ringing in the ears.     Reports compliance w/ medications though does not have list w/ her at this moment as it is with the daughter who will be bedside in AM.     Denies CP, SOB, palpitations, abd pain, N/V, constipation or diarrhea.     In the ED VSS w/ labs non-actionable and ECG w/ NSR w/ left axis deviation and CT w/ tiny acute SAH and stable aneurysm.   (21 Aug 2024 05:25)    Hospital Course:  89yo F w/ PMH of HTN, HLD, cerebral aneurysms, stable AAA pw dizziness x1 day. Reports dizziness sensation in the middle of the night last night when she woke up to use the restroom. Reports symptoms persisted and worse when walking and better w/ rest and more of a lightheaded sensation. In the ED VSS w/ labs non-actionable and ECG w/ NSR w/ left axis deviation and CT w/ tiny acute SAH and stable aneurysm and found to be orthostatic positive. BP meds stopped and IVF given. Repeat CTH done and stable. Orthostatics rechecked and now improved. BP stable will discharge off of blood pressure medications until follow up with PMD. Patient to stay off ASA as well. Patient is stable for discharge home.       Important Medication Changes and Reason:  Stop Aspirin, Bystolic and Hydrochlorothiazide     Active or Pending Issues Requiring Follow-up:  Dr. Victorina Murdock    Advanced Directives:   [X] Full code  [ ] DNR  [ ] Hospice    Discharge Diagnoses:  Dizziness   SAH

## 2024-09-26 ENCOUNTER — INPATIENT (INPATIENT)
Facility: HOSPITAL | Age: 89
LOS: 0 days | Discharge: LEFT BEFORE DISCHARGE | DRG: 641 | End: 2024-09-27
Attending: HOSPITALIST | Admitting: HOSPITALIST
Payer: MEDICARE

## 2024-09-26 VITALS
SYSTOLIC BLOOD PRESSURE: 174 MMHG | HEART RATE: 76 BPM | OXYGEN SATURATION: 97 % | RESPIRATION RATE: 16 BRPM | DIASTOLIC BLOOD PRESSURE: 91 MMHG | WEIGHT: 110.01 LBS | HEIGHT: 61 IN | TEMPERATURE: 97 F

## 2024-09-26 DIAGNOSIS — S62.102A FRACTURE OF UNSPECIFIED CARPAL BONE, LEFT WRIST, INITIAL ENCOUNTER FOR CLOSED FRACTURE: Chronic | ICD-10-CM

## 2024-09-26 DIAGNOSIS — Z98.89 OTHER SPECIFIED POSTPROCEDURAL STATES: Chronic | ICD-10-CM

## 2024-09-26 DIAGNOSIS — Z90.10 ACQUIRED ABSENCE OF UNSPECIFIED BREAST AND NIPPLE: Chronic | ICD-10-CM

## 2024-09-26 DIAGNOSIS — E87.1 HYPO-OSMOLALITY AND HYPONATREMIA: ICD-10-CM

## 2024-09-26 DIAGNOSIS — Z98.49 CATARACT EXTRACTION STATUS, UNSPECIFIED EYE: Chronic | ICD-10-CM

## 2024-09-26 LAB
ALBUMIN SERPL ELPH-MCNC: 4.2 G/DL — SIGNIFICANT CHANGE UP (ref 3.3–5)
ALP SERPL-CCNC: 74 U/L — SIGNIFICANT CHANGE UP (ref 40–120)
ALT FLD-CCNC: 25 U/L — SIGNIFICANT CHANGE UP (ref 10–45)
ANION GAP SERPL CALC-SCNC: 16 MMOL/L — SIGNIFICANT CHANGE UP (ref 5–17)
ANION GAP SERPL CALC-SCNC: 19 MMOL/L — HIGH (ref 5–17)
APTT BLD: 30.4 SEC — SIGNIFICANT CHANGE UP (ref 24.5–35.6)
AST SERPL-CCNC: 44 U/L — HIGH (ref 10–40)
BASOPHILS # BLD AUTO: 0.01 K/UL — SIGNIFICANT CHANGE UP (ref 0–0.2)
BASOPHILS NFR BLD AUTO: 0.1 % — SIGNIFICANT CHANGE UP (ref 0–2)
BILIRUB SERPL-MCNC: 0.4 MG/DL — SIGNIFICANT CHANGE UP (ref 0.2–1.2)
BUN SERPL-MCNC: 15 MG/DL — SIGNIFICANT CHANGE UP (ref 7–23)
BUN SERPL-MCNC: 19 MG/DL — SIGNIFICANT CHANGE UP (ref 7–23)
CALCIUM SERPL-MCNC: 8.1 MG/DL — LOW (ref 8.4–10.5)
CALCIUM SERPL-MCNC: 8.8 MG/DL — SIGNIFICANT CHANGE UP (ref 8.4–10.5)
CHLORIDE SERPL-SCNC: 84 MMOL/L — LOW (ref 96–108)
CHLORIDE SERPL-SCNC: 88 MMOL/L — LOW (ref 96–108)
CO2 SERPL-SCNC: 19 MMOL/L — LOW (ref 22–31)
CO2 SERPL-SCNC: 21 MMOL/L — LOW (ref 22–31)
CREAT SERPL-MCNC: 0.68 MG/DL — SIGNIFICANT CHANGE UP (ref 0.5–1.3)
CREAT SERPL-MCNC: 0.83 MG/DL — SIGNIFICANT CHANGE UP (ref 0.5–1.3)
EGFR: 68 ML/MIN/1.73M2 — SIGNIFICANT CHANGE UP
EGFR: 84 ML/MIN/1.73M2 — SIGNIFICANT CHANGE UP
EOSINOPHIL # BLD AUTO: 0 K/UL — SIGNIFICANT CHANGE UP (ref 0–0.5)
EOSINOPHIL NFR BLD AUTO: 0 % — SIGNIFICANT CHANGE UP (ref 0–6)
FLUAV AG NPH QL: SIGNIFICANT CHANGE UP
FLUBV AG NPH QL: SIGNIFICANT CHANGE UP
GLUCOSE SERPL-MCNC: 114 MG/DL — HIGH (ref 70–99)
GLUCOSE SERPL-MCNC: 188 MG/DL — HIGH (ref 70–99)
HCT VFR BLD CALC: 35.2 % — SIGNIFICANT CHANGE UP (ref 34.5–45)
HGB BLD-MCNC: 12.3 G/DL — SIGNIFICANT CHANGE UP (ref 11.5–15.5)
IMM GRANULOCYTES NFR BLD AUTO: 0.3 % — SIGNIFICANT CHANGE UP (ref 0–0.9)
INR BLD: 1.04 RATIO — SIGNIFICANT CHANGE UP (ref 0.85–1.16)
LYMPHOCYTES # BLD AUTO: 1.12 K/UL — SIGNIFICANT CHANGE UP (ref 1–3.3)
LYMPHOCYTES # BLD AUTO: 15.9 % — SIGNIFICANT CHANGE UP (ref 13–44)
MCHC RBC-ENTMCNC: 31.2 PG — SIGNIFICANT CHANGE UP (ref 27–34)
MCHC RBC-ENTMCNC: 34.9 GM/DL — SIGNIFICANT CHANGE UP (ref 32–36)
MCV RBC AUTO: 89.3 FL — SIGNIFICANT CHANGE UP (ref 80–100)
MONOCYTES # BLD AUTO: 0.41 K/UL — SIGNIFICANT CHANGE UP (ref 0–0.9)
MONOCYTES NFR BLD AUTO: 5.8 % — SIGNIFICANT CHANGE UP (ref 2–14)
NEUTROPHILS # BLD AUTO: 5.5 K/UL — SIGNIFICANT CHANGE UP (ref 1.8–7.4)
NEUTROPHILS NFR BLD AUTO: 77.9 % — HIGH (ref 43–77)
NRBC # BLD: 0 /100 WBCS — SIGNIFICANT CHANGE UP (ref 0–0)
PLATELET # BLD AUTO: 207 K/UL — SIGNIFICANT CHANGE UP (ref 150–400)
POTASSIUM SERPL-MCNC: 3.6 MMOL/L — SIGNIFICANT CHANGE UP (ref 3.5–5.3)
POTASSIUM SERPL-MCNC: 4.4 MMOL/L — SIGNIFICANT CHANGE UP (ref 3.5–5.3)
POTASSIUM SERPL-SCNC: 3.6 MMOL/L — SIGNIFICANT CHANGE UP (ref 3.5–5.3)
POTASSIUM SERPL-SCNC: 4.4 MMOL/L — SIGNIFICANT CHANGE UP (ref 3.5–5.3)
PROT SERPL-MCNC: 7.1 G/DL — SIGNIFICANT CHANGE UP (ref 6–8.3)
PROTHROM AB SERPL-ACNC: 11.8 SEC — SIGNIFICANT CHANGE UP (ref 9.9–13.4)
RBC # BLD: 3.94 M/UL — SIGNIFICANT CHANGE UP (ref 3.8–5.2)
RBC # FLD: 13.4 % — SIGNIFICANT CHANGE UP (ref 10.3–14.5)
RSV RNA NPH QL NAA+NON-PROBE: SIGNIFICANT CHANGE UP
SARS-COV-2 RNA SPEC QL NAA+PROBE: SIGNIFICANT CHANGE UP
SODIUM SERPL-SCNC: 123 MMOL/L — LOW (ref 135–145)
SODIUM SERPL-SCNC: 124 MMOL/L — LOW (ref 135–145)
WBC # BLD: 7.06 K/UL — SIGNIFICANT CHANGE UP (ref 3.8–10.5)
WBC # FLD AUTO: 7.06 K/UL — SIGNIFICANT CHANGE UP (ref 3.8–10.5)

## 2024-09-26 PROCEDURE — 70496 CT ANGIOGRAPHY HEAD: CPT | Mod: 26,MC

## 2024-09-26 PROCEDURE — 70498 CT ANGIOGRAPHY NECK: CPT | Mod: 26,MC

## 2024-09-26 PROCEDURE — 70450 CT HEAD/BRAIN W/O DYE: CPT | Mod: 26,MC,XU

## 2024-09-26 PROCEDURE — 99285 EMERGENCY DEPT VISIT HI MDM: CPT | Mod: GC

## 2024-09-26 RX ORDER — METOCLOPRAMIDE HCL 5 MG
10 TABLET ORAL ONCE
Refills: 0 | Status: COMPLETED | OUTPATIENT
Start: 2024-09-26 | End: 2024-09-26

## 2024-09-26 RX ORDER — SODIUM CHLORIDE 0.9 % (FLUSH) 0.9 %
1000 SYRINGE (ML) INJECTION ONCE
Refills: 0 | Status: COMPLETED | OUTPATIENT
Start: 2024-09-26 | End: 2024-09-26

## 2024-09-26 RX ORDER — ACETAMINOPHEN 325 MG
750 TABLET ORAL ONCE
Refills: 0 | Status: COMPLETED | OUTPATIENT
Start: 2024-09-26 | End: 2024-09-26

## 2024-09-26 RX ADMIN — Medication 300 MILLIGRAM(S): at 19:38

## 2024-09-26 RX ADMIN — Medication 1000 MILLILITER(S): at 18:24

## 2024-09-26 RX ADMIN — Medication 10 MILLIGRAM(S): at 18:24

## 2024-09-26 NOTE — ED PROVIDER NOTE - PROGRESS NOTE DETAILS
Gabe Andrew MD: Na 123, patient to be admitted to medicine. Urine studies sent. NSGY to see patient given history, will drop recs.

## 2024-09-26 NOTE — ED PROVIDER NOTE - CLINICAL SUMMARY MEDICAL DECISION MAKING FREE TEXT BOX
Patient with headache and dizziness. Given recent admission for hemorrhage in the setting of aneurysm, there is concern for repeat bleed and/or cerebral ischemia. Will obtain CT and CTA imaging of head and neck. Will treat pain with reglan and ofirmev, reassessment to follow. Patient with headache and dizziness. Given recent admission for hemorrhage in the setting of aneurysm, there is concern for repeat bleed and/or cerebral ischemia. Will obtain CT and CTA imaging of head and neck. Will treat pain with reglan and ofirmev, reassessment to follow.    CARLOS MANUEL Andrade MD: Agree with resident/ACP MDM, assessment and plan as above.

## 2024-09-26 NOTE — ED ADULT NURSE REASSESSMENT NOTE - NS ED NURSE REASSESS COMMENT FT1
Handoff taken from CHERELLE Jones in purple. Introduced self to pt, resting in bed, VSS. Awaiting CT/ CTr, updated on POC. Comfort and safety maintained at this time.

## 2024-09-26 NOTE — ED PROVIDER NOTE - PHYSICAL EXAMINATION
General: alert, oriented to person, time, place  Psych: mood appropriate  Head: normocephalic; atraumatic  Eyes: PERRLA, EOMI; conjunctivae clear bilaterally, sclerae anicteric  ENT: no nasal flaring, patent nares  Cardio: non-tachycardic; skin warm and well perfused  Resp: normal respiratory effort; no accessory muscle use  GI: no active vomiting  Neuro: strength 5/5 in upper and lower extremities; sensation intact to light touch in all dermatomes; CN II-XII intact  Skin: No evidence of rash or bruising  MSK: normal movement of all extremities  Lymph/Vasc: no LE edema

## 2024-09-26 NOTE — ED PROVIDER NOTE - CARE PLAN
1 Principal Discharge DX:	Vertigo  Secondary Diagnosis:	Head pain   Principal Discharge DX:	Hyponatremia  Secondary Diagnosis:	Head pain

## 2024-09-26 NOTE — ED PROVIDER NOTE - OBJECTIVE STATEMENT
89F with recent admission for SAH associated with a small aneurysm presents to the emergency department with headache, dizziness, nausea for 3 days. She has no associated vision changes but feels slightly unsteady on her feet. She denies any recent sick symptoms including cough, congestion, fevers or chills. No recent falls.

## 2024-09-26 NOTE — ED ADULT NURSE NOTE - OBJECTIVE STATEMENT
89 yo presents to the ED from home. A&Ox4, ambulatory c/o headache, dizziness, nausea x 1 week worsening over the last couple of days. pt reports back of head throbbing HA. denies any acute vision changes, has known macular degeneration. pt denies any trauma. history of aneurysms as per pt daughter at bedside which her PMD is "watching" given her age. Patient undressed and placed into gown, call bell in hand and side rails up for safety. warm blanket provided, vital signs stable, pt in no acute distress.

## 2024-09-27 ENCOUNTER — TRANSCRIPTION ENCOUNTER (OUTPATIENT)
Age: 89
End: 2024-09-27

## 2024-09-27 VITALS
DIASTOLIC BLOOD PRESSURE: 74 MMHG | RESPIRATION RATE: 18 BRPM | HEART RATE: 75 BPM | SYSTOLIC BLOOD PRESSURE: 116 MMHG | TEMPERATURE: 98 F | OXYGEN SATURATION: 98 %

## 2024-09-27 DIAGNOSIS — R73.09 OTHER ABNORMAL GLUCOSE: ICD-10-CM

## 2024-09-27 DIAGNOSIS — E87.1 HYPO-OSMOLALITY AND HYPONATREMIA: ICD-10-CM

## 2024-09-27 DIAGNOSIS — R63.4 ABNORMAL WEIGHT LOSS: ICD-10-CM

## 2024-09-27 DIAGNOSIS — J98.01 ACUTE BRONCHOSPASM: ICD-10-CM

## 2024-09-27 DIAGNOSIS — Z75.8 OTHER PROBLEMS RELATED TO MEDICAL FACILITIES AND OTHER HEALTH CARE: ICD-10-CM

## 2024-09-27 DIAGNOSIS — Z29.9 ENCOUNTER FOR PROPHYLACTIC MEASURES, UNSPECIFIED: ICD-10-CM

## 2024-09-27 DIAGNOSIS — Z86.79 PERSONAL HISTORY OF OTHER DISEASES OF THE CIRCULATORY SYSTEM: ICD-10-CM

## 2024-09-27 DIAGNOSIS — R51.9 HEADACHE, UNSPECIFIED: ICD-10-CM

## 2024-09-27 LAB
A1C WITH ESTIMATED AVERAGE GLUCOSE RESULT: 5.6 % — SIGNIFICANT CHANGE UP (ref 4–5.6)
ANION GAP SERPL CALC-SCNC: 12 MMOL/L — SIGNIFICANT CHANGE UP (ref 5–17)
ANION GAP SERPL CALC-SCNC: 16 MMOL/L — SIGNIFICANT CHANGE UP (ref 5–17)
APPEARANCE UR: CLEAR — SIGNIFICANT CHANGE UP
BILIRUB UR-MCNC: NEGATIVE — SIGNIFICANT CHANGE UP
BUN SERPL-MCNC: 10 MG/DL — SIGNIFICANT CHANGE UP (ref 7–23)
BUN SERPL-MCNC: 12 MG/DL — SIGNIFICANT CHANGE UP (ref 7–23)
CALCIUM SERPL-MCNC: 8.6 MG/DL — SIGNIFICANT CHANGE UP (ref 8.4–10.5)
CALCIUM SERPL-MCNC: 8.6 MG/DL — SIGNIFICANT CHANGE UP (ref 8.4–10.5)
CHLORIDE SERPL-SCNC: 91 MMOL/L — LOW (ref 96–108)
CHLORIDE SERPL-SCNC: 93 MMOL/L — LOW (ref 96–108)
CO2 SERPL-SCNC: 22 MMOL/L — SIGNIFICANT CHANGE UP (ref 22–31)
CO2 SERPL-SCNC: 25 MMOL/L — SIGNIFICANT CHANGE UP (ref 22–31)
COLOR SPEC: YELLOW — SIGNIFICANT CHANGE UP
CREAT ?TM UR-MCNC: 53 MG/DL — SIGNIFICANT CHANGE UP
CREAT SERPL-MCNC: 0.62 MG/DL — SIGNIFICANT CHANGE UP (ref 0.5–1.3)
CREAT SERPL-MCNC: 0.65 MG/DL — SIGNIFICANT CHANGE UP (ref 0.5–1.3)
DIFF PNL FLD: NEGATIVE — SIGNIFICANT CHANGE UP
EGFR: 85 ML/MIN/1.73M2 — SIGNIFICANT CHANGE UP
EGFR: 86 ML/MIN/1.73M2 — SIGNIFICANT CHANGE UP
ESTIMATED AVERAGE GLUCOSE: 114 MG/DL — SIGNIFICANT CHANGE UP (ref 68–114)
FERRITIN SERPL-MCNC: 1553 NG/ML — HIGH (ref 13–330)
GLUCOSE SERPL-MCNC: 89 MG/DL — SIGNIFICANT CHANGE UP (ref 70–99)
GLUCOSE SERPL-MCNC: 93 MG/DL — SIGNIFICANT CHANGE UP (ref 70–99)
GLUCOSE UR QL: NEGATIVE MG/DL — SIGNIFICANT CHANGE UP
IRON SATN MFR SERPL: 31 % — SIGNIFICANT CHANGE UP (ref 14–50)
IRON SATN MFR SERPL: 83 UG/DL — SIGNIFICANT CHANGE UP (ref 30–160)
KETONES UR-MCNC: 15 MG/DL
LEUKOCYTE ESTERASE UR-ACNC: NEGATIVE — SIGNIFICANT CHANGE UP
NITRITE UR-MCNC: NEGATIVE — SIGNIFICANT CHANGE UP
OSMOLALITY UR: 373 MOS/KG — SIGNIFICANT CHANGE UP (ref 300–900)
PH UR: 6.5 — SIGNIFICANT CHANGE UP (ref 5–8)
POTASSIUM SERPL-MCNC: 3.1 MMOL/L — LOW (ref 3.5–5.3)
POTASSIUM SERPL-MCNC: 3.6 MMOL/L — SIGNIFICANT CHANGE UP (ref 3.5–5.3)
POTASSIUM SERPL-SCNC: 3.1 MMOL/L — LOW (ref 3.5–5.3)
POTASSIUM SERPL-SCNC: 3.6 MMOL/L — SIGNIFICANT CHANGE UP (ref 3.5–5.3)
POTASSIUM UR-SCNC: 49 MMOL/L — SIGNIFICANT CHANGE UP
PROT ?TM UR-MCNC: 10 MG/DL — SIGNIFICANT CHANGE UP (ref 0–12)
PROT UR-MCNC: NEGATIVE MG/DL — SIGNIFICANT CHANGE UP
PROT/CREAT UR-RTO: 0.2 RATIO — SIGNIFICANT CHANGE UP (ref 0–0.2)
SODIUM SERPL-SCNC: 128 MMOL/L — LOW (ref 135–145)
SODIUM SERPL-SCNC: 131 MMOL/L — LOW (ref 135–145)
SODIUM UR-SCNC: 45 MMOL/L — SIGNIFICANT CHANGE UP
SP GR SPEC: 1.01 — SIGNIFICANT CHANGE UP (ref 1–1.03)
TIBC SERPL-MCNC: 268 UG/DL — SIGNIFICANT CHANGE UP (ref 220–430)
UIBC SERPL-MCNC: 185 UG/DL — SIGNIFICANT CHANGE UP (ref 110–370)
UROBILINOGEN FLD QL: 0.2 MG/DL — SIGNIFICANT CHANGE UP (ref 0.2–1)
UUN UR-MCNC: 482 MG/DL — SIGNIFICANT CHANGE UP

## 2024-09-27 PROCEDURE — 76775 US EXAM ABDO BACK WALL LIM: CPT

## 2024-09-27 PROCEDURE — 84300 ASSAY OF URINE SODIUM: CPT

## 2024-09-27 PROCEDURE — 81003 URINALYSIS AUTO W/O SCOPE: CPT

## 2024-09-27 PROCEDURE — 82728 ASSAY OF FERRITIN: CPT

## 2024-09-27 PROCEDURE — 83935 ASSAY OF URINE OSMOLALITY: CPT

## 2024-09-27 PROCEDURE — 80053 COMPREHEN METABOLIC PANEL: CPT

## 2024-09-27 PROCEDURE — 82570 ASSAY OF URINE CREATININE: CPT

## 2024-09-27 PROCEDURE — 84156 ASSAY OF PROTEIN URINE: CPT

## 2024-09-27 PROCEDURE — 83036 HEMOGLOBIN GLYCOSYLATED A1C: CPT

## 2024-09-27 PROCEDURE — 71045 X-RAY EXAM CHEST 1 VIEW: CPT | Mod: 26

## 2024-09-27 PROCEDURE — 85730 THROMBOPLASTIN TIME PARTIAL: CPT

## 2024-09-27 PROCEDURE — 76770 US EXAM ABDO BACK WALL COMP: CPT | Mod: 26

## 2024-09-27 PROCEDURE — 99223 1ST HOSP IP/OBS HIGH 75: CPT

## 2024-09-27 PROCEDURE — 36415 COLL VENOUS BLD VENIPUNCTURE: CPT

## 2024-09-27 PROCEDURE — 80048 BASIC METABOLIC PNL TOTAL CA: CPT

## 2024-09-27 PROCEDURE — 85025 COMPLETE CBC W/AUTO DIFF WBC: CPT

## 2024-09-27 PROCEDURE — 96375 TX/PRO/DX INJ NEW DRUG ADDON: CPT

## 2024-09-27 PROCEDURE — 94640 AIRWAY INHALATION TREATMENT: CPT

## 2024-09-27 PROCEDURE — 99285 EMERGENCY DEPT VISIT HI MDM: CPT

## 2024-09-27 PROCEDURE — 83550 IRON BINDING TEST: CPT

## 2024-09-27 PROCEDURE — 70498 CT ANGIOGRAPHY NECK: CPT | Mod: MC

## 2024-09-27 PROCEDURE — 84540 ASSAY OF URINE/UREA-N: CPT

## 2024-09-27 PROCEDURE — 96374 THER/PROPH/DIAG INJ IV PUSH: CPT

## 2024-09-27 PROCEDURE — 84133 ASSAY OF URINE POTASSIUM: CPT

## 2024-09-27 PROCEDURE — 85610 PROTHROMBIN TIME: CPT

## 2024-09-27 PROCEDURE — 87637 SARSCOV2&INF A&B&RSV AMP PRB: CPT

## 2024-09-27 PROCEDURE — 70496 CT ANGIOGRAPHY HEAD: CPT | Mod: MC

## 2024-09-27 PROCEDURE — 70450 CT HEAD/BRAIN W/O DYE: CPT | Mod: MC

## 2024-09-27 PROCEDURE — 83540 ASSAY OF IRON: CPT

## 2024-09-27 PROCEDURE — 71045 X-RAY EXAM CHEST 1 VIEW: CPT

## 2024-09-27 RX ORDER — ACETAMINOPHEN 325 MG
650 TABLET ORAL EVERY 6 HOURS
Refills: 0 | Status: DISCONTINUED | OUTPATIENT
Start: 2024-09-27 | End: 2024-09-27

## 2024-09-27 RX ORDER — CRANBERRY FRUIT EXTRACT 650 MG
2000 CAPSULE ORAL DAILY
Refills: 0 | Status: DISCONTINUED | OUTPATIENT
Start: 2024-09-27 | End: 2024-09-27

## 2024-09-27 RX ORDER — IPRATROPIUM BROMIDE AND ALBUTEROL SULFATE .5; 3 MG/3ML; MG/3ML
3 SOLUTION RESPIRATORY (INHALATION) EVERY 6 HOURS
Refills: 0 | Status: DISCONTINUED | OUTPATIENT
Start: 2024-09-27 | End: 2024-09-27

## 2024-09-27 RX ORDER — ATORVASTATIN CALCIUM 10 MG/1
10 TABLET, FILM COATED ORAL AT BEDTIME
Refills: 0 | Status: DISCONTINUED | OUTPATIENT
Start: 2024-09-27 | End: 2024-09-27

## 2024-09-27 RX ORDER — NEBIVOLOL 2.5 MG/1
1 TABLET ORAL
Refills: 0 | DISCHARGE

## 2024-09-27 RX ORDER — HYDROCHLOROTHIAZIDE 50 MG/1
1 TABLET ORAL
Refills: 0 | DISCHARGE

## 2024-09-27 RX ADMIN — IPRATROPIUM BROMIDE AND ALBUTEROL SULFATE 3 MILLILITER(S): .5; 3 SOLUTION RESPIRATORY (INHALATION) at 06:27

## 2024-09-27 RX ADMIN — IPRATROPIUM BROMIDE AND ALBUTEROL SULFATE 3 MILLILITER(S): .5; 3 SOLUTION RESPIRATORY (INHALATION) at 02:11

## 2024-09-27 RX ADMIN — Medication 40 MILLIEQUIVALENT(S): at 10:33

## 2024-09-27 RX ADMIN — Medication 1 PATCH: at 12:31

## 2024-09-27 RX ADMIN — Medication 650 MILLIGRAM(S): at 10:32

## 2024-09-27 RX ADMIN — Medication 650 MILLIGRAM(S): at 11:02

## 2024-09-27 NOTE — DISCHARGE NOTE PROVIDER - HOSPITAL COURSE
HPI:  NIGHT HOSPITALIST:   Patient UNKNOWN to me previously, assigned to me at this point via the ER to admit this independent 89 y/o F--followed by her physicians above-- PCP office records not available to me for review--patient with a history of LEFT MRM for breast CA 10 years ago presumed to be in remission, essential HTN, reported stable AAA, persistent 0.5 pack/day cigarette use since late teens, with a recent discharge from [place] Aug 22 2024 following a concern for SAH at the time with patient seen by NS with apparent tiny SAH  with repeat CTT stable.   Patient previously on BP Rx which were discontinued (unclear if patient is has resumed her HCTZ but patient reports she takes the 12.5 dose and unclear if she takes the Bystolic) with patient self refers to the ER following symptoms of headache, dizziness for 3 days.  NO focal weakness.  NO LOC, no trauma.  NO HA at present.  NO N/V.    Patient with productive cough but denies fever, chills, rigors.  No hemoptysis.   + wheezing.   Notes occasional limitation in aerobic activity but denies dyspnoea at present.   No chest pain/pressure.  Patient agrees to Nicoderm patch at the intermediate dose (patient reports 0.5 pack /daily).   Denies prior admissions related to pneumonia and was never previously diagnosed with COPD.  Patient reports past COVID-19 vaccine but does not recall last booster.   Denies prior COVID-19 infection. (27 Sep 2024 01:40)    Hospital Course:  CT Scan of head was performed showing Previously seen subcentimeter hyperdense focus at right occipital lobe is smaller than before, potentially an evolved mineralized focus of prior hemorrhage and that aneurysm was stable. NeuroSx consulted and advice no acute intervention. Patients had an Aortic US which showed partially thrombosed AAA of 4.4cm. Vascular was called and ....  Patient was cleared for discharge home as symptoms improved.       Important Medication Changes and Reason: See medication reconciliation    Active or Pending Issues Requiring Follow-up:Continue outpatient NSx follow up    Advanced Directives:   [ X] Full code  [ ] DNR  [ ] Hospice    Discharge Diagnoses: SAH  ACtive smoker  HEadache  Hyponatremia         HPI:  NIGHT HOSPITALIST:   Patient UNKNOWN to me previously, assigned to me at this point via the ER to admit this independent 89 y/o F--followed by her physicians above-- PCP office records not available to me for review--patient with a history of LEFT MRM for breast CA 10 years ago presumed to be in remission, essential HTN, reported stable AAA, persistent 0.5 pack/day cigarette use since late teens, with a recent discharge from [place] Aug 22 2024 following a concern for SAH at the time with patient seen by NS with apparent tiny SAH  with repeat CTT stable.   Patient previously on BP Rx which were discontinued (unclear if patient is has resumed her HCTZ but patient reports she takes the 12.5 dose and unclear if she takes the Bystolic) with patient self refers to the ER following symptoms of headache, dizziness for 3 days.  NO focal weakness.  NO LOC, no trauma.  NO HA at present.  NO N/V.    Patient with productive cough but denies fever, chills, rigors.  No hemoptysis.   + wheezing.   Notes occasional limitation in aerobic activity but denies dyspnoea at present.   No chest pain/pressure.  Patient agrees to Nicoderm patch at the intermediate dose (patient reports 0.5 pack /daily).   Denies prior admissions related to pneumonia and was never previously diagnosed with COPD.  Patient reports past COVID-19 vaccine but does not recall last booster.   Denies prior COVID-19 infection. (27 Sep 2024 01:40)    Hospital Course:  CT Scan of head was performed showing Previously seen subcentimeter hyperdense focus at right occipital lobe is smaller than before, potentially an evolved mineralized focus of prior hemorrhage and that aneurysm was stable. NeuroSx consulted and advice no acute intervention. Patients had an Aortic US which showed partially thrombosed AAA of 4.4cm. Vascular was called and but patient wished to leave prior to completion of consult.   Patient left against advice as work up was not completed.       Important Medication Changes and Reason: See medication reconciliation    Active or Pending Issues Requiring Follow-up: Continue outpatient NSx follow up    Advanced Directives:   [ X] Full code  [ ] DNR  [ ] Hospice    Discharge Diagnoses: SAH  ACtive smoker  HEadache  Hyponatremia

## 2024-09-27 NOTE — DISCHARGE NOTE PROVIDER - CARE PROVIDER_API CALL
Carmelina Da Silva  Neurosurgery  [address], [address]  [place] [address]  Phone: [phone]  Fax: [phone]  Follow Up Time:

## 2024-09-27 NOTE — H&P ADULT - PROBLEM SELECTOR PLAN 3
Patient with likely chronic hyponatremia with intact sensorium, and with interview with patient may have resumed her HCTZ>> advised patient NOT to resume HCTZ, which can produce hyponatremia.   Will fluid restrict.

## 2024-09-27 NOTE — H&P ADULT - PROBLEM SELECTOR PLAN 7
STEPHANIE for now.   Will defer pharmacologic DVT prophylaxis to the Daytime Provider with history of apparent resolved SAH.

## 2024-09-27 NOTE — H&P ADULT - NSHPADDITIONALINFOADULT_GEN_ALL_CORE
NIGHT HOSPITALIST;    Patient /daughter aware of course and agrees with plan/care as above.  The patient did not wish for me to contact her daughter at this hour.   Emotional support provided to patient.  The patient is not yet ready to discuss advance directives.   Care reviewed with covering NP/PA for endorsement to my physician colleagues in the AM.    Isaac Garrido MD  Available on [employer] Teams. NIGHT HOSPITALIST;    Patient /daughter aware of course and agrees with plan/care as above.  The patient did not wish for me to contact her daughter at this hour.   Emotional support provided to patient.  The patient is not yet ready to discuss advance directives.   Care reviewed with covering NP/MO Grier for endorsement to my physician colleagues in the AM.    Isaac Garrido MD  Available on [employer] Teams.

## 2024-09-27 NOTE — PROGRESS NOTE ADULT - PROBLEM SELECTOR PLAN 1
Presentation of patient with HA syndrome in the setting of patient with essential HTN with recent discharge last month with apparent resolution of prior hemorrhage, with no acute bleeding, and stable multiple aneurysms.    No neurosurgical intervention   Headache resolved

## 2024-09-27 NOTE — H&P ADULT - PROBLEM SELECTOR PLAN 8
Transitions of Care Status:  1.  Name of PCP:    Ingrid Prajapati MD (PCP)--[facility]   2.  PCP Contacted on Admission: [ ] Y    [x ] N    3.  PCP contacted at Discharge: [ ] Y    [ ] N    [ ] N/A  4.  Post-Discharge Appointment Date and Location:  5.  Summary of Handoff given to PCP:

## 2024-09-27 NOTE — H&P ADULT - NSHPLABSRESULTS_GEN_ALL_CORE
Chest radiograph interpreted by me with hyperinflated lungs, no infiltrate or effusion.    WBC 7.0  (no differential)    Hgb 12.3    Platelets of 207K    Cr 0.6    Random glucose 114>>  188 (?)    Na+ 124, 123.    RVP screen>> nil Chest radiograph interpreted by me with hyperinflated lungs, no infiltrate or effusion.    WBC 7.0  (no differential)    Hgb 12.3    Platelets of 207K    Cr 0.6    Random glucose 114>>  188 (?)    Na+ 124, 123.    RVP screen>> nil    CTT head with no bleed/mass.   Previously seen subcentimeter hyperdense focus RIGHT occipital lobe smaller than before, potentially an evolved mineralized focus of prior hemorrhage.  Advanced white matter microangiopathic sequelae    Stable multiple aneurysms, largest at RIGHT supraclinoid at 12 mm length.  NO carotid dissection. Chest radiograph interpreted by me with hyperinflated lungs, no infiltrate or effusion.    WBC 7.0  (no differential)    Hgb 12.3    Platelets of 207K    Cr 0.6    Random glucose 114>>  188 (?)    Na+ 124, 123.    RVP screen>> nil    CTT head with no bleed/mass.   Previously seen subcentimeter hyperdense focus RIGHT occipital lobe smaller than before, potentially an evolved mineralized focus of prior hemorrhage.  Advanced white matter microangiopathic sequelae    Stable multiple aneurysms, largest at RIGHT supraclinoid at 12 mm length.  NO carotid dissection.    Unable to locate EKG in the ER.

## 2024-09-27 NOTE — DISCHARGE NOTE NURSING/CASE MANAGEMENT/SOCIAL WORK - PATIENT PORTAL LINK FT
You can access the FollowMyHealth Patient Portal offered by [facility] by registering at the following website: [email or web address]. By joining [facility]’s FollowMyHealth portal, you will also be able to view your health information using other applications (apps) compatible with our system.

## 2024-09-27 NOTE — H&P ADULT - HISTORY OF PRESENT ILLNESS
NIGHT HOSPITALIST:   Patient UNKNOWN to me previously, assigned to me at this point via the ER to admit this independent 88 y/o F--followed by her physicians above-- PCP office records not available to me for review--patient with a history of LEFT MRM for breast CA 10 years ago presumed to be in remission, essential HTN NIGHT HOSPITALIST:   Patient UNKNOWN to me previously, assigned to me at this point via the ER to admit this independent 89 y/o F--followed by her physicians above-- PCP office records not available to me for review--patient with a history of LEFT MRM for breast CA 10 years ago presumed to be in remission, essential HTN, reported stable AAA, persistent 0.5 pack/day cigarette use since late teens, with a recent discharge from [place] Aug 22 2024 following a concern for SAH at the time with patient seen by NS with apparent tiny SAH  with repeat CTT stable.   Patient previously on BP Rx which were discontinued (unclear if patient is has resumed her HCTZ but patient reports she takes the 12.5 dose and unclear if she takes the Bystolic) with patient self refers to the ER following symptoms of headache, dizziness for 3 days.  NO focal weakness.  NO LOC, no trauma.  NO HA at present.  NO N/V.    Patient with productive cough but denies fever, chills, rigors.  No hemoptysis.   + wheezing.   Notes occasional limitation in aerobic activity but denies dyspnoea at present.   No chest pain/pressure.  Patient agrees to Nicoderm patch at the intermediate dose (patient reports 0.5 pack /daily).   Denies prior admissions related to pneumonia and was never previously diagnosed with COPD.  Patient reports past COVID-19 vaccine but does not recall last booster.   Denies prior COVID-19 infection.

## 2024-09-27 NOTE — DISCHARGE NOTE NURSING/CASE MANAGEMENT/SOCIAL WORK - NSDCPEFALRISK_GEN_ALL_CORE
For information on Fall & Injury Prevention, visit: [email or web address] OR  [email or web address] OR  https://www.cdc.gov/steadi/patient.html

## 2024-09-27 NOTE — DISCHARGE NOTE NURSING/CASE MANAGEMENT/SOCIAL WORK - NSDCPETBCESMAN_GEN_ALL_CORE
History of pulmonary embolism and DVT noted, patient on apixaban at home  Discussed with patient and her son on 3/23 and 3/24, will not restart apixaban given ongoing GI bleeding and shifting goals of care  If you are a smoker, it is important for your health to stop smoking. Please be aware that second hand smoke is also harmful.

## 2024-09-27 NOTE — H&P ADULT - NSHPSOURCEINFOTX_GEN_ALL_CORE
Reviewed partial Medex with patient from [facility] from admission 8/21/24 from 2018--[facility]  closed at this hour.  Daughter, Tiana Rolon  aware of admission and the patient did not wish for me to contact at this hour.

## 2024-09-27 NOTE — H&P ADULT - ASSESSMENT
NIGHT HOSPITALIST:   NIGHT HOSPITALIST:    Presentation of patient with HA syndrome in the setting of patient with essential HTN with recent discharge last month with apparent resolution of prior hemorrhage, with no acute bleeding, and stable multiple aneurysms.   I reviewed with NS who will make further recommendations and will review with NS need for further imaging (i.e. MR).    Patient with bronchospasm, likely related to previously undiagnosed COPD given heavy tobacco history.   Patient agrees to quit tobacco and agrees to the Nicoderm patch.   Will provide Duoneb for now.   Would consider formal Pulmonary evaluation in the AM.    Patient with likely chronic hyponatremia with intact sensorium, and with interview with patient may have resumed her HCTZ>> advised patient NOT to resume HCTZ, which can produce hyponatremia.   Will fluid restrict.    Will check HgbA1C with noted elevated glucose.    Will obtain US for patient's AAA.    Iron studies ordered.  Would consider clarifying in the AM from PCP recent outpatient workup for patient's weight loss.

## 2024-09-27 NOTE — CONSULT NOTE ADULT - ASSESSMENT
89F on ASA81 for heart health, hx HTN, HLD, stable AAA, known intracraial aneurysms for which she follows w/ Dr. Da Silva outpt and was rec’d for obs w/ serial imaging p/f HA, dizziness, one instance of nausea. Currently says HA is mild, denies n/v, neck stiffness/pain, blurry vision. Recent p/f similar sx in August. CTH neg for acute heme, tiny L occipital hyperdensity smaller than CTH 8/21/24. CTA w/ multiple known aneurysms incl. R supraclinoid ICA, R MCA bifurcation, L supraclinoid ICA , and L M1 aneurysm which are all stable from 8/20/24, which was stable from 2/2024. Exam: Neck supple. Neg Mj/Destinee, neurointact  -No acute neurosurgical intervention  -Continue routine outpatient f/u with Dr. Da Silva (planned for rpt MRA 2/2025 and RTC post MR)

## 2024-09-27 NOTE — H&P ADULT - PROBLEM SELECTOR PLAN 6
Iron studies ordered.      Would consider clarifying in the AM from PCP recent outpatient workup for patient's weight loss.

## 2024-09-27 NOTE — PROGRESS NOTE ADULT - ASSESSMENT
89F history of LEFT MRM for breast CA 10 years ago presumed to be in remission, essential HTN, reported stable AAA, persistent 0.5 pack/day cigarette use since late teens, with a recent discharge from [place] Aug 22 2024 following a concern for SAH

## 2024-09-27 NOTE — H&P ADULT - NSICDXPASTMEDICALHX_GEN_ALL_CORE_FT
PAST MEDICAL HISTORY:  AAA (abdominal aortic aneurysm) stable    Breast CA     Chronic obstructive pulmonary disease     History of weight loss     HLD (hyperlipidemia)     HTN (hypertension)     Hyperlipidemia     Hypertension     Macular degeneration     Nicotine dependence     Osteoporosis     Seasonal allergies     TB (tuberculosis) as a child

## 2024-09-27 NOTE — H&P ADULT - NSHPSOCIALHISTORY_GEN_ALL_CORE
No ethanol.   0.5 pack/day since late teens.   Agrees to quit and agrees to intermediate dose Nicoderm patch.  Daughter lives with patient.   The patient is retired from managing a company.

## 2024-09-27 NOTE — DISCHARGE NOTE PROVIDER - CARE PROVIDERS DIRECT ADDRESSES
[email or web address] Yes, Non-Core measure site...

## 2024-09-27 NOTE — DISCHARGE NOTE PROVIDER - NSDCCPCAREPLAN_GEN_ALL_CORE_FT
PRINCIPAL DISCHARGE DIAGNOSIS  Diagnosis: Hyponatremia  Assessment and Plan of Treatment: Your sodium level was low which may be due to the Hydrochlorothiazide you take.  Please remain off the hydrochlorothiazide. Pleae follow up with your doctor to to repeat bloodwork.      SECONDARY DISCHARGE DIAGNOSES  Diagnosis: Head pain  Assessment and Plan of Treatment: The bleeding in your head was improved from prior admission. Please continue follow up with your doctor.    Diagnosis: History of abdominal aortic aneurysm (AAA)  Assessment and Plan of Treatment: Your Aortic Anuersym was noted to have a section of thrombosis for which a vascular surgery consult was recommended but you left against advice prior to this. Please follow up with your medical team to further evaluate.

## 2024-09-27 NOTE — PROGRESS NOTE ADULT - SUBJECTIVE AND OBJECTIVE BOX
Patient is a 89y old  Female who presents with a chief complaint of Persistent generalize HA, dizziness for 3 days, productive cough (27 Sep 2024 12:53)      SUBJECTIVE / OVERNIGHT EVENTS: pt feels better, denies headache, no cp, sob, abd pain, chills     MEDICATIONS  (STANDING):  albuterol/ipratropium for Nebulization 3 milliLiter(s) Nebulizer every 6 hours  atorvastatin 10 milliGRAM(s) Oral at bedtime  cholecalciferol 2000 Unit(s) Oral daily  nicotine -  14 mG/24Hr(s) Patch 1 Patch Transdermal daily    MEDICATIONS  (PRN):  acetaminophen     Tablet .. 650 milliGRAM(s) Oral every 6 hours PRN Temp greater or equal to 38C (100.4F), Mild Pain (1 - 3)        CAPILLARY BLOOD GLUCOSE        I&O's Summary      PHYSICAL EXAM:  GENERAL: NAD, well-developed  HEAD:  Atraumatic, Normocephalic  EYES: EOMI, PERRLA, conjunctiva and sclera clear  NECK: Supple, No JVD  CHEST/LUNG: Clear to auscultation bilaterally; No wheeze  HEART: Regular rate and rhythm; No murmurs, rubs, or gallops  ABDOMEN: Soft, Nontender, Nondistended; Bowel sounds present  EXTREMITIES:  2+ Peripheral Pulses, No clubbing, cyanosis, or edema  PSYCH: AAOx3  NEUROLOGY: non-focal  SKIN: No rashes or lesions    LABS:                        12.3   7.06  )-----------( 207      ( 26 Sep 2024 18:05 )             35.2     09-27    128[L]  |  91[L]  |  10  ----------------------------<  89  3.6   |  25  |  0.62    Ca    8.6      27 Sep 2024 11:45    TPro  7.1  /  Alb  4.2  /  TBili  0.4  /  DBili  x   /  AST  44[H]  /  ALT  25  /  AlkPhos  74  09-26    PT/INR - ( 26 Sep 2024 18:05 )   PT: 11.8 sec;   INR: 1.04 ratio         PTT - ( 26 Sep 2024 18:05 )  PTT:30.4 sec      Urinalysis Basic - ( 27 Sep 2024 11:45 )    Color: x / Appearance: x / SG: x / pH: x  Gluc: 89 mg/dL / Ketone: x  / Bili: x / Urobili: x   Blood: x / Protein: x / Nitrite: x   Leuk Esterase: x / RBC: x / WBC x   Sq Epi: x / Non Sq Epi: x / Bacteria: x        RADIOLOGY & ADDITIONAL TESTS:    Imaging Personally Reviewed:    Consultant(s) Notes Reviewed:      Care Discussed with Consultants/Other Providers: vascular surgery

## 2024-09-27 NOTE — H&P ADULT - PROBLEM SELECTOR PLAN 2
Patient with bronchospasm, likely related to previously undiagnosed COPD given heavy tobacco history.   Patient agrees to quit tobacco and agrees to the Nicoderm patch.   Will provide Duoneb for now.       Would consider formal Pulmonary evaluation in the AM.

## 2024-09-27 NOTE — DISCHARGE NOTE PROVIDER - NSDCMRMEDTOKEN_GEN_ALL_CORE_FT
Bystolic 5 mg oral tablet: 1 tab(s) orally once a day  ferrous sulfate 325 mg (65 mg elemental iron) oral delayed release tablet: 1 tab(s) orally once a day  hydroCHLOROthiazide 12.5 mg oral tablet: 1 tab(s) orally once a day  simvastatin 20 mg oral tablet: 1 tab(s) orally once a day (at bedtime)  Vitamin D3 2000 intl units oral capsule: 1 cap(s) orally once a day   Bystolic 5 mg oral tablet: 1 tab(s) orally once a day  ferrous sulfate 325 mg (65 mg elemental iron) oral delayed release tablet: 1 tab(s) orally once a day  nicotine 14 mg/24 hr transdermal film, extended release: transdermal once a day  simvastatin 20 mg oral tablet: 1 tab(s) orally once a day (at bedtime)  Vitamin D3 2000 intl units oral capsule: 1 cap(s) orally once a day

## 2024-09-27 NOTE — H&P ADULT - PROBLEM SELECTOR PLAN 1
Presentation of patient with HA syndrome in the setting of patient with essential HTN with recent discharge last month with apparent resolution of prior hemorrhage, with no acute bleeding, and stable multiple aneurysms.   I reviewed with NS who will make further recommendations and will review with NS need for further imaging (i.e. MR).

## 2024-09-27 NOTE — CONSULT NOTE ADULT - SUBJECTIVE AND OBJECTIVE BOX
p ([id number])     HPI:  NIGHT HOSPITALIST:   Patient UNKNOWN to me previously, assigned to me at this point via the ER to admit this independent 88 y/o F--followed by her physicians above-- PCP office records not available to me for review--patient with a history of LEFT MRM for breast CA 10 years ago presumed to be in remission, essential HTN, reported stable AAA, persistent 0.5 pack/day cigarette use since late teens, with a recent discharge from [place] Aug 22 2024 following a concern for SAH at the time with patient seen by NS with apparent tiny SAH  with repeat CTT stable.   Patient previously on BP Rx which were discontinued (unclear if patient is has resumed her HCTZ but patient reports she takes the 12.5 dose and unclear if she takes the Bystolic) with patient self refers to the ER following symptoms of headache, dizziness for 3 days.  NO focal weakness.  NO LOC, no trauma.  NO HA at present.  NO N/V.    Patient with productive cough but denies fever, chills, rigors.  No hemoptysis.   + wheezing.   Notes occasional limitation in aerobic activity but denies dyspnoea at present.   No chest pain/pressure.  Patient agrees to Nicoderm patch at the intermediate dose (patient reports 0.5 pack /daily).   Denies prior admissions related to pneumonia and was never previously diagnosed with COPD.  Patient reports past COVID-19 vaccine but does not recall last booster.   Denies prior COVID-19 infection. (27 Sep 2024 01:40)      --Anticoagulation:    =====================  PAST MEDICAL HISTORY   Breast CA    Hypertension    Hyperlipidemia    Osteoporosis    Seasonal allergies    AAA (abdominal aortic aneurysm)    TB (tuberculosis)    Chronic obstructive pulmonary disease    Nicotine dependence    Macular degeneration    HTN (hypertension)    HLD (hyperlipidemia)    History of weight loss      PAST SURGICAL HISTORY   Left wrist fracture    Port-a-cath in place    H/O left breast biopsy    History of cataract extraction    S/P mastectomy    H/O mastectomy      lisinopril (Angioedema)      MEDICATIONS:  Antibiotics:    Neuro:  acetaminophen     Tablet .. 650 milliGRAM(s) Oral every 6 hours PRN    Other:  albuterol/ipratropium for Nebulization 3 milliLiter(s) Nebulizer every 6 hours  atorvastatin 10 milliGRAM(s) Oral at bedtime  cholecalciferol 2000 Unit(s) Oral daily      SOCIAL HISTORY:   Occupation:   Marital Status:     FAMILY HISTORY:  Family history of other condition    Family history of diabetes mellitus (DM) (Sibling)    Family history of heart disease (Sibling)    FH: heart disease (Sibling)        ROS: Negative except per HPI    LABS:  PT/INR - ( 26 Sep 2024 18:05 )   PT: 11.8 sec;   INR: 1.04 ratio         PTT - ( 26 Sep 2024 18:05 )  PTT:30.4 sec                        12.3   7.06  )-----------( 207      ( 26 Sep 2024 18:05 )             35.2     09-26    123[L]  |  88[L]  |  15  ----------------------------<  188[H]  3.6   |  19[L]  |  0.68    Ca    8.1[L]      26 Sep 2024 21:34    TPro  7.1  /  Alb  4.2  /  TBili  0.4  /  DBili  x   /  AST  44[H]  /  ALT  25  /  AlkPhos  74  09-26

## 2024-09-27 NOTE — H&P ADULT - NSHPREVIEWOFSYSTEMS_GEN_ALL_CORE
NO focal weakness.  NO chest pain/pressure.  NO palpitations.  NO breast symptoms.  NO abdominal pain, no red blood per rectum or melena.  NO back pain, no tearing back pain. NO focal weakness.  NO chest pain/pressure.  NO palpitations.  NO breast symptoms.  NO abdominal pain, no red blood per rectum or melena.  NO back pain, no tearing back pain.    NO rash.  NO vaginal bleeding.  NO dysuria no hematuria  NO thyroid symptoms.    + unspecified weight loss. NO focal weakness.  NO chest pain/pressure.  NO palpitations.  NO breast symptoms.  NO abdominal pain, no red blood per rectum or melena.  NO back pain, no tearing back pain.    NO rash.  NO vaginal bleeding.  NO dysuria no hematuria  NO thyroid symptoms.    + unspecified weight loss.   Denies early satiety or odynophagia.   Denies voice changes.

## 2024-09-27 NOTE — ED ADULT NURSE REASSESSMENT NOTE - NS ED NURSE REASSESS COMMENT FT1
Pt ambulated to bathroom w/ +1 assist. Denies any discomfort and this time. Admitted to medicine, awaiting bed on floor, now under care of hallway holding, updated on POC. ACP Sherrill currently at bedside assessing pt, safety maintained.

## 2024-09-29 PROCEDURE — 70450 CT HEAD/BRAIN W/O DYE: CPT | Mod: MC

## 2024-09-29 PROCEDURE — 97116 GAIT TRAINING THERAPY: CPT

## 2024-09-29 PROCEDURE — 87086 URINE CULTURE/COLONY COUNT: CPT

## 2024-09-29 PROCEDURE — 96361 HYDRATE IV INFUSION ADD-ON: CPT

## 2024-09-29 PROCEDURE — 99285 EMERGENCY DEPT VISIT HI MDM: CPT

## 2024-09-29 PROCEDURE — 85027 COMPLETE CBC AUTOMATED: CPT

## 2024-09-29 PROCEDURE — 81001 URINALYSIS AUTO W/SCOPE: CPT

## 2024-09-29 PROCEDURE — 97161 PT EVAL LOW COMPLEX 20 MIN: CPT

## 2024-09-29 PROCEDURE — 70498 CT ANGIOGRAPHY NECK: CPT | Mod: MC

## 2024-09-29 PROCEDURE — 93005 ELECTROCARDIOGRAM TRACING: CPT

## 2024-09-29 PROCEDURE — 84484 ASSAY OF TROPONIN QUANT: CPT

## 2024-09-29 PROCEDURE — 85025 COMPLETE CBC W/AUTO DIFF WBC: CPT

## 2024-09-29 PROCEDURE — 76937 US GUIDE VASCULAR ACCESS: CPT

## 2024-09-29 PROCEDURE — 97110 THERAPEUTIC EXERCISES: CPT

## 2024-09-29 PROCEDURE — 80053 COMPREHEN METABOLIC PANEL: CPT

## 2024-09-29 PROCEDURE — 96360 HYDRATION IV INFUSION INIT: CPT

## 2024-09-29 PROCEDURE — 80048 BASIC METABOLIC PNL TOTAL CA: CPT

## 2024-09-29 PROCEDURE — 71045 X-RAY EXAM CHEST 1 VIEW: CPT

## 2024-09-29 PROCEDURE — 70496 CT ANGIOGRAPHY HEAD: CPT | Mod: MC

## 2024-09-29 PROCEDURE — 36410 VNPNXR 3YR/> PHY/QHP DX/THER: CPT

## 2024-09-29 PROCEDURE — 84132 ASSAY OF SERUM POTASSIUM: CPT

## 2024-10-05 ENCOUNTER — EMERGENCY (EMERGENCY)
Facility: HOSPITAL | Age: 89
LOS: 1 days | Discharge: HOME | End: 2024-10-05
Attending: EMERGENCY MEDICINE
Payer: MEDICARE

## 2024-10-05 VITALS
SYSTOLIC BLOOD PRESSURE: 155 MMHG | HEIGHT: 61 IN | RESPIRATION RATE: 16 BRPM | OXYGEN SATURATION: 100 % | WEIGHT: 104.94 LBS | HEART RATE: 73 BPM | TEMPERATURE: 98 F | DIASTOLIC BLOOD PRESSURE: 78 MMHG

## 2024-10-05 VITALS
OXYGEN SATURATION: 99 % | RESPIRATION RATE: 18 BRPM | SYSTOLIC BLOOD PRESSURE: 132 MMHG | HEART RATE: 74 BPM | TEMPERATURE: 98 F | DIASTOLIC BLOOD PRESSURE: 87 MMHG

## 2024-10-05 DIAGNOSIS — S62.102A FRACTURE OF UNSPECIFIED CARPAL BONE, LEFT WRIST, INITIAL ENCOUNTER FOR CLOSED FRACTURE: Chronic | ICD-10-CM

## 2024-10-05 DIAGNOSIS — Z90.10 ACQUIRED ABSENCE OF UNSPECIFIED BREAST AND NIPPLE: Chronic | ICD-10-CM

## 2024-10-05 DIAGNOSIS — Z98.89 OTHER SPECIFIED POSTPROCEDURAL STATES: Chronic | ICD-10-CM

## 2024-10-05 DIAGNOSIS — Z98.49 CATARACT EXTRACTION STATUS, UNSPECIFIED EYE: Chronic | ICD-10-CM

## 2024-10-05 PROBLEM — Z87.898 PERSONAL HISTORY OF OTHER SPECIFIED CONDITIONS: Chronic | Status: ACTIVE | Noted: 2024-09-27

## 2024-10-05 LAB
ALBUMIN SERPL ELPH-MCNC: 4.5 G/DL — SIGNIFICANT CHANGE UP (ref 3.3–5)
ALP SERPL-CCNC: 80 U/L — SIGNIFICANT CHANGE UP (ref 40–120)
ALT FLD-CCNC: 25 U/L — SIGNIFICANT CHANGE UP (ref 10–45)
ANION GAP SERPL CALC-SCNC: 16 MMOL/L — SIGNIFICANT CHANGE UP (ref 5–17)
APPEARANCE UR: CLEAR — SIGNIFICANT CHANGE UP
AST SERPL-CCNC: 32 U/L — SIGNIFICANT CHANGE UP (ref 10–40)
BACTERIA # UR AUTO: NEGATIVE /HPF — SIGNIFICANT CHANGE UP
BASOPHILS # BLD AUTO: 0.02 K/UL — SIGNIFICANT CHANGE UP (ref 0–0.2)
BASOPHILS NFR BLD AUTO: 0.2 % — SIGNIFICANT CHANGE UP (ref 0–2)
BILIRUB SERPL-MCNC: 0.5 MG/DL — SIGNIFICANT CHANGE UP (ref 0.2–1.2)
BILIRUB UR-MCNC: NEGATIVE — SIGNIFICANT CHANGE UP
BUN SERPL-MCNC: 10 MG/DL — SIGNIFICANT CHANGE UP (ref 7–23)
CALCIUM SERPL-MCNC: 9.6 MG/DL — SIGNIFICANT CHANGE UP (ref 8.4–10.5)
CAST: 0 /LPF — SIGNIFICANT CHANGE UP (ref 0–4)
CHLORIDE SERPL-SCNC: 98 MMOL/L — SIGNIFICANT CHANGE UP (ref 96–108)
CO2 SERPL-SCNC: 22 MMOL/L — SIGNIFICANT CHANGE UP (ref 22–31)
COLOR SPEC: YELLOW — SIGNIFICANT CHANGE UP
CREAT SERPL-MCNC: 0.68 MG/DL — SIGNIFICANT CHANGE UP (ref 0.5–1.3)
DIFF PNL FLD: NEGATIVE — SIGNIFICANT CHANGE UP
EGFR: 84 ML/MIN/1.73M2 — SIGNIFICANT CHANGE UP
EOSINOPHIL # BLD AUTO: 0.02 K/UL — SIGNIFICANT CHANGE UP (ref 0–0.5)
EOSINOPHIL NFR BLD AUTO: 0.2 % — SIGNIFICANT CHANGE UP (ref 0–6)
FLUAV AG NPH QL: SIGNIFICANT CHANGE UP
FLUBV AG NPH QL: SIGNIFICANT CHANGE UP
GLUCOSE SERPL-MCNC: 109 MG/DL — HIGH (ref 70–99)
GLUCOSE UR QL: NEGATIVE MG/DL — SIGNIFICANT CHANGE UP
HCT VFR BLD CALC: 38.8 % — SIGNIFICANT CHANGE UP (ref 34.5–45)
HGB BLD-MCNC: 12.8 G/DL — SIGNIFICANT CHANGE UP (ref 11.5–15.5)
IMM GRANULOCYTES NFR BLD AUTO: 0.7 % — SIGNIFICANT CHANGE UP (ref 0–0.9)
KETONES UR-MCNC: NEGATIVE MG/DL — SIGNIFICANT CHANGE UP
LEUKOCYTE ESTERASE UR-ACNC: NEGATIVE — SIGNIFICANT CHANGE UP
LYMPHOCYTES # BLD AUTO: 1.1 K/UL — SIGNIFICANT CHANGE UP (ref 1–3.3)
LYMPHOCYTES # BLD AUTO: 12.3 % — LOW (ref 13–44)
MCHC RBC-ENTMCNC: 30.3 PG — SIGNIFICANT CHANGE UP (ref 27–34)
MCHC RBC-ENTMCNC: 33 GM/DL — SIGNIFICANT CHANGE UP (ref 32–36)
MCV RBC AUTO: 91.7 FL — SIGNIFICANT CHANGE UP (ref 80–100)
MONOCYTES # BLD AUTO: 0.45 K/UL — SIGNIFICANT CHANGE UP (ref 0–0.9)
MONOCYTES NFR BLD AUTO: 5 % — SIGNIFICANT CHANGE UP (ref 2–14)
NEUTROPHILS # BLD AUTO: 7.31 K/UL — SIGNIFICANT CHANGE UP (ref 1.8–7.4)
NEUTROPHILS NFR BLD AUTO: 81.6 % — HIGH (ref 43–77)
NITRITE UR-MCNC: NEGATIVE — SIGNIFICANT CHANGE UP
NRBC # BLD: 0 /100 WBCS — SIGNIFICANT CHANGE UP (ref 0–0)
PH UR: 7.5 — SIGNIFICANT CHANGE UP (ref 5–8)
PLATELET # BLD AUTO: 257 K/UL — SIGNIFICANT CHANGE UP (ref 150–400)
POTASSIUM SERPL-MCNC: 4 MMOL/L — SIGNIFICANT CHANGE UP (ref 3.5–5.3)
POTASSIUM SERPL-SCNC: 4 MMOL/L — SIGNIFICANT CHANGE UP (ref 3.5–5.3)
PROT SERPL-MCNC: 7.5 G/DL — SIGNIFICANT CHANGE UP (ref 6–8.3)
PROT UR-MCNC: NEGATIVE MG/DL — SIGNIFICANT CHANGE UP
RBC # BLD: 4.23 M/UL — SIGNIFICANT CHANGE UP (ref 3.8–5.2)
RBC # FLD: 14.4 % — SIGNIFICANT CHANGE UP (ref 10.3–14.5)
RBC CASTS # UR COMP ASSIST: 4 /HPF — SIGNIFICANT CHANGE UP (ref 0–4)
RSV RNA NPH QL NAA+NON-PROBE: SIGNIFICANT CHANGE UP
SARS-COV-2 RNA SPEC QL NAA+PROBE: SIGNIFICANT CHANGE UP
SODIUM SERPL-SCNC: 136 MMOL/L — SIGNIFICANT CHANGE UP (ref 135–145)
SP GR SPEC: 1.01 — SIGNIFICANT CHANGE UP (ref 1–1.03)
SQUAMOUS # UR AUTO: 0 /HPF — SIGNIFICANT CHANGE UP (ref 0–5)
UROBILINOGEN FLD QL: 0.2 MG/DL — SIGNIFICANT CHANGE UP (ref 0.2–1)
WBC # BLD: 8.96 K/UL — SIGNIFICANT CHANGE UP (ref 3.8–10.5)
WBC # FLD AUTO: 8.96 K/UL — SIGNIFICANT CHANGE UP (ref 3.8–10.5)
WBC UR QL: 0 /HPF — SIGNIFICANT CHANGE UP (ref 0–5)

## 2024-10-05 PROCEDURE — 99284 EMERGENCY DEPT VISIT MOD MDM: CPT | Mod: GC

## 2024-10-05 PROCEDURE — 71045 X-RAY EXAM CHEST 1 VIEW: CPT | Mod: 26

## 2024-10-05 RX ORDER — SODIUM CHLORIDE 0.9 % (FLUSH) 0.9 %
1000 SYRINGE (ML) INJECTION ONCE
Refills: 0 | Status: COMPLETED | OUTPATIENT
Start: 2024-10-05 | End: 2024-10-05

## 2024-10-05 RX ORDER — METOCLOPRAMIDE HCL 5 MG
10 TABLET ORAL ONCE
Refills: 0 | Status: COMPLETED | OUTPATIENT
Start: 2024-10-05 | End: 2024-10-05

## 2024-10-05 RX ORDER — ACETAMINOPHEN 325 MG
725 TABLET ORAL ONCE
Refills: 0 | Status: COMPLETED | OUTPATIENT
Start: 2024-10-05 | End: 2024-10-05

## 2024-10-05 RX ADMIN — Medication 290 MILLIGRAM(S): at 10:11

## 2024-10-05 RX ADMIN — Medication 10 MILLIGRAM(S): at 10:11

## 2024-10-05 RX ADMIN — Medication 1000 MILLILITER(S): at 10:12

## 2024-10-05 NOTE — ED PROVIDER NOTE - PROGRESS NOTE DETAILS
Patients labs all within normal limits. At this time, do not feel need to re-scan as recent scans all showed stable changes and symptoms have not worsened in that time. Patient is feeling much better after Reglan and Acetaminophen. Will DC Home with strict return precautions and Neurology follow up.

## 2024-10-05 NOTE — ED ADULT NURSE NOTE - NSFALLRISKINTERV_ED_ALL_ED

## 2024-10-05 NOTE — ED ADULT NURSE REASSESSMENT NOTE - NS ED NURSE REASSESS COMMENT FT1
Pt verbalizes understanding to f/u with PCP/neuro  and return to ED for any worsening symptoms. Patient d/c home w/ written and verbal instructions. Pt verbalized understanding. IV d/c - No redness or swelling /no bleeding

## 2024-10-05 NOTE — ED PROVIDER NOTE - ATTENDING CONTRIBUTION TO CARE
88 y/o F--followed by her physicians above-- PCP office records not available to me for review--patient with a history of LEFT MRM for breast CA 10 years ago presumed to be in remission, essential HTN, reported stable AAA, persistent 0.5 pack/day cigarette use since late teens, with a recent discharge from [place] [id number] With similar symptoms of headache lightheadedness nausea decreased p.o. intake.  Patient is well-appearing noted to have a full neurological workup several days ago with no new changes nonfocal neuroexam repeatedly complaining of mild headache and nausea migraine cocktail was ordered IV fluids check labs with prior hyponatremia reassess likely for discharge and emergent neurology follow-up

## 2024-10-05 NOTE — ED PROVIDER NOTE - NSFOLLOWUPCLINICS_GEN_ALL_ED_FT
[facility]  Neurology  [address] [place] [address] [address] [place] [address]  Phone: [phone]  Fax:

## 2024-10-05 NOTE — ED PROVIDER NOTE - OBJECTIVE STATEMENT
89-year-old female with a past medical history of abdominal aortic aneurysm (4.4 cm) and stable brain aneurysms, presenting today with worsening headache.  The patient's symptoms are acute on chronic.  She is having cyclical headaches and nausea for several weeks.  The symptoms are identical to what she experienced upon last presentation.  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  On the last visit the patient also had a CT angio of the head, and neck, as well as a Noncon head CT.  At that time, September 26, 2024 there was no acute intracranial hemorrhage seen, and there was stable multiple aneurysms on short follow-up.  There was also a hyperdense focus on the right occipital lobe which was smaller than before.  The patient is presenting today again with headache, that is intermittent.  Patient has not been taking any medications at home for this headache.  On last visit, patient was found to be hyponatremic.  Patient denies any headache being acute in onset, did not denies it being the worst headache of her life, or worst than the last visit.  Patient denies any sick contacts, denies recent fever, chills, nausea, vomiting, diarrhea.  Denies any visual disturbances that are new.

## 2024-10-05 NOTE — ED PROVIDER NOTE - NS ED ROS FT
GENERAL: No fever or chills  EYES: No change in vision  HEENT: No trouble swallowing or speaking  CARDIAC: No chest pain  PULMONARY: No cough or SOB  GI: No abdominal pain, no nausea or no vomiting, no diarrhea or constipation  : No changes in urination  SKIN: No rashes  NEURO:+ Headache, no numbness  MSK: No joint pain  Otherwise as HPI or negative.

## 2024-10-05 NOTE — ED PROVIDER NOTE - NSFOLLOWUPINSTRUCTIONS_ED_ALL_ED_FT
Headache    You were seen in the emergency department for headache.  Basic labs were drawn, which all showed results within normal limits.  At this time, there is no immediate need for urgent intervention.  Headache symptoms are described as similar to last week's visit, which imaging was done for and showed stable changes.  You improved with IV Reglan, fluids, and Tylenol.  Neurology follow-up has been placed in this paperwork.  Follow-up with neurology within the next several days.  Please call the number below, tell them you were seen in the emergency department today, and to establish care.  Please return to the emergency department for any worsening headache, chest pain, shortness of breath, nausea, vomiting, diarrhea, dizziness, weakness, altered mental status.  Please inform your primary care physician of the events that occurred today.    A headache is pain or discomfort felt around the head or neck area. The specific cause of a headache may not be found as there are many types including tension headaches, migraine headaches, and cluster headaches. Watch your condition for any changes. Things you can do to manage your pain include taking over the counter and prescription medications as instructed by your health care provider, lying down in a dark quiet room, limiting stress, getting regular sleep, and refraining from alcohol and tobacco products.    SEEK IMMEDIATE MEDICAL CARE IF YOU HAVE ANY OF THE FOLLOWING SYMPTOMS: fever, vomiting, stiff neck, loss of vision, problems with speech, muscle weakness, loss of balance, trouble walking, passing out, or confusion.

## 2024-10-05 NOTE — ED ADULT NURSE NOTE - OBJECTIVE STATEMENT
89yo F aaox4 h/o HTN, HLD, breast CA , brain aneurysm , AAA presents to Ed from home as per pt 2 days ago gradually onset a "pounding " HA, nausea, dizziness, lightheaded, "off balance"  on examinations Patient is well appearing, skin warm and intact, PERRL, EOM intact, sensory intact, equal strength b/l in all upper and lower extremities. Pt denies CP, SOB, vision changes, v/d, fevers chills, abdominal pain. Safety and comfort measures initiated- bed placed in lowest position and side rails raised. Pt oriented to call bell system.

## 2024-10-05 NOTE — ED PROVIDER NOTE - PATIENT PORTAL LINK FT
You can access the FollowMyHealth Patient Portal offered by [facility] by registering at the following website: [email or web address]. By joining [employer]’s FollowMyHealth portal, you will also be able to view your health information using other applications (apps) compatible with our system.

## 2024-10-07 LAB
CULTURE RESULTS: SIGNIFICANT CHANGE UP
SPECIMEN SOURCE: SIGNIFICANT CHANGE UP

## 2024-10-15 ENCOUNTER — OUTPATIENT (OUTPATIENT)
Dept: OUTPATIENT SERVICES | Facility: HOSPITAL | Age: 89
LOS: 1 days | End: 2024-10-15
Payer: MEDICARE

## 2024-10-15 ENCOUNTER — APPOINTMENT (OUTPATIENT)
Dept: NEUROLOGY | Facility: HOSPITAL | Age: 89
End: 2024-10-15
Payer: MEDICARE

## 2024-10-15 VITALS
DIASTOLIC BLOOD PRESSURE: 87 MMHG | WEIGHT: 105 LBS | BODY MASS INDEX: 19.83 KG/M2 | TEMPERATURE: 97.5 F | HEIGHT: 61 IN | SYSTOLIC BLOOD PRESSURE: 159 MMHG | RESPIRATION RATE: 14 BRPM | OXYGEN SATURATION: 99 % | HEART RATE: 70 BPM

## 2024-10-15 DIAGNOSIS — Z98.89 OTHER SPECIFIED POSTPROCEDURAL STATES: Chronic | ICD-10-CM

## 2024-10-15 DIAGNOSIS — Z90.10 ACQUIRED ABSENCE OF UNSPECIFIED BREAST AND NIPPLE: Chronic | ICD-10-CM

## 2024-10-15 DIAGNOSIS — S62.102A FRACTURE OF UNSPECIFIED CARPAL BONE, LEFT WRIST, INITIAL ENCOUNTER FOR CLOSED FRACTURE: Chronic | ICD-10-CM

## 2024-10-15 DIAGNOSIS — Z98.49 CATARACT EXTRACTION STATUS, UNSPECIFIED EYE: Chronic | ICD-10-CM

## 2024-10-15 DIAGNOSIS — R51.9 HEADACHE, UNSPECIFIED: ICD-10-CM

## 2024-10-15 DIAGNOSIS — M54.2 CERVICALGIA: ICD-10-CM

## 2024-10-15 PROCEDURE — 99203 OFFICE O/P NEW LOW 30 MIN: CPT

## 2024-11-20 PROCEDURE — G0463: CPT

## 2024-11-20 PROCEDURE — 99285 EMERGENCY DEPT VISIT HI MDM: CPT | Mod: 25

## 2024-11-20 PROCEDURE — 80053 COMPREHEN METABOLIC PANEL: CPT

## 2024-11-20 PROCEDURE — 93005 ELECTROCARDIOGRAM TRACING: CPT

## 2024-11-20 PROCEDURE — 96374 THER/PROPH/DIAG INJ IV PUSH: CPT

## 2024-11-20 PROCEDURE — 87086 URINE CULTURE/COLONY COUNT: CPT

## 2024-11-20 PROCEDURE — 71045 X-RAY EXAM CHEST 1 VIEW: CPT

## 2024-11-20 PROCEDURE — 81001 URINALYSIS AUTO W/SCOPE: CPT

## 2024-11-20 PROCEDURE — 87637 SARSCOV2&INF A&B&RSV AMP PRB: CPT

## 2024-11-20 PROCEDURE — 96375 TX/PRO/DX INJ NEW DRUG ADDON: CPT

## 2024-11-20 PROCEDURE — 85025 COMPLETE CBC W/AUTO DIFF WBC: CPT

## 2025-04-14 ENCOUNTER — INPATIENT (INPATIENT)
Facility: HOSPITAL | Age: 89
LOS: 2 days | Discharge: HOME HEALTH | DRG: 204 | End: 2025-04-17
Attending: INTERNAL MEDICINE | Admitting: INTERNAL MEDICINE
Payer: MEDICARE

## 2025-04-14 VITALS
HEIGHT: 62 IN | OXYGEN SATURATION: 96 % | WEIGHT: 100.09 LBS | HEART RATE: 71 BPM | RESPIRATION RATE: 20 BRPM | DIASTOLIC BLOOD PRESSURE: 64 MMHG | TEMPERATURE: 98 F | SYSTOLIC BLOOD PRESSURE: 130 MMHG

## 2025-04-14 DIAGNOSIS — Z98.89 OTHER SPECIFIED POSTPROCEDURAL STATES: Chronic | ICD-10-CM

## 2025-04-14 DIAGNOSIS — Z90.10 ACQUIRED ABSENCE OF UNSPECIFIED BREAST AND NIPPLE: Chronic | ICD-10-CM

## 2025-04-14 DIAGNOSIS — S62.102A FRACTURE OF UNSPECIFIED CARPAL BONE, LEFT WRIST, INITIAL ENCOUNTER FOR CLOSED FRACTURE: Chronic | ICD-10-CM

## 2025-04-14 DIAGNOSIS — Z98.49 CATARACT EXTRACTION STATUS, UNSPECIFIED EYE: Chronic | ICD-10-CM

## 2025-04-14 DIAGNOSIS — R06.02 SHORTNESS OF BREATH: ICD-10-CM

## 2025-04-14 LAB
ALBUMIN SERPL ELPH-MCNC: 3.8 G/DL — SIGNIFICANT CHANGE UP (ref 3.3–5)
ALP SERPL-CCNC: 76 U/L — SIGNIFICANT CHANGE UP (ref 40–120)
ALT FLD-CCNC: 33 U/L — SIGNIFICANT CHANGE UP (ref 10–45)
ANION GAP SERPL CALC-SCNC: 14 MMOL/L — SIGNIFICANT CHANGE UP (ref 5–17)
APTT BLD: 29.9 SEC — SIGNIFICANT CHANGE UP (ref 24.5–35.6)
AST SERPL-CCNC: 33 U/L — SIGNIFICANT CHANGE UP (ref 10–40)
BASOPHILS # BLD AUTO: 0.01 K/UL — SIGNIFICANT CHANGE UP (ref 0–0.2)
BASOPHILS NFR BLD AUTO: 0.2 % — SIGNIFICANT CHANGE UP (ref 0–2)
BILIRUB SERPL-MCNC: 0.5 MG/DL — SIGNIFICANT CHANGE UP (ref 0.2–1.2)
BUN SERPL-MCNC: 14 MG/DL — SIGNIFICANT CHANGE UP (ref 7–23)
CALCIUM SERPL-MCNC: 8.5 MG/DL — SIGNIFICANT CHANGE UP (ref 8.4–10.5)
CHLORIDE SERPL-SCNC: 98 MMOL/L — SIGNIFICANT CHANGE UP (ref 96–108)
CO2 SERPL-SCNC: 21 MMOL/L — LOW (ref 22–31)
CREAT SERPL-MCNC: 0.67 MG/DL — SIGNIFICANT CHANGE UP (ref 0.5–1.3)
EGFR: 84 ML/MIN/1.73M2 — SIGNIFICANT CHANGE UP
EGFR: 84 ML/MIN/1.73M2 — SIGNIFICANT CHANGE UP
EOSINOPHIL # BLD AUTO: 0.02 K/UL — SIGNIFICANT CHANGE UP (ref 0–0.5)
EOSINOPHIL NFR BLD AUTO: 0.3 % — SIGNIFICANT CHANGE UP (ref 0–6)
GAS PNL BLDV: SIGNIFICANT CHANGE UP
GLUCOSE SERPL-MCNC: 97 MG/DL — SIGNIFICANT CHANGE UP (ref 70–99)
HCT VFR BLD CALC: 38.6 % — SIGNIFICANT CHANGE UP (ref 34.5–45)
HGB BLD-MCNC: 12.7 G/DL — SIGNIFICANT CHANGE UP (ref 11.5–15.5)
IMM GRANULOCYTES NFR BLD AUTO: 0.5 % — SIGNIFICANT CHANGE UP (ref 0–0.9)
INR BLD: 1.02 RATIO — SIGNIFICANT CHANGE UP (ref 0.85–1.16)
LYMPHOCYTES # BLD AUTO: 1.06 K/UL — SIGNIFICANT CHANGE UP (ref 1–3.3)
LYMPHOCYTES # BLD AUTO: 18.1 % — SIGNIFICANT CHANGE UP (ref 13–44)
MAGNESIUM SERPL-MCNC: 1.8 MG/DL — SIGNIFICANT CHANGE UP (ref 1.6–2.6)
MCHC RBC-ENTMCNC: 29.9 PG — SIGNIFICANT CHANGE UP (ref 27–34)
MCHC RBC-ENTMCNC: 32.9 G/DL — SIGNIFICANT CHANGE UP (ref 32–36)
MCV RBC AUTO: 90.8 FL — SIGNIFICANT CHANGE UP (ref 80–100)
MONOCYTES # BLD AUTO: 0.55 K/UL — SIGNIFICANT CHANGE UP (ref 0–0.9)
MONOCYTES NFR BLD AUTO: 9.4 % — SIGNIFICANT CHANGE UP (ref 2–14)
NEUTROPHILS # BLD AUTO: 4.19 K/UL — SIGNIFICANT CHANGE UP (ref 1.8–7.4)
NEUTROPHILS NFR BLD AUTO: 71.5 % — SIGNIFICANT CHANGE UP (ref 43–77)
NRBC BLD AUTO-RTO: 0 /100 WBCS — SIGNIFICANT CHANGE UP (ref 0–0)
NT-PROBNP SERPL-SCNC: 2440 PG/ML — HIGH (ref 0–300)
PLATELET # BLD AUTO: 190 K/UL — SIGNIFICANT CHANGE UP (ref 150–400)
POTASSIUM SERPL-MCNC: 4 MMOL/L — SIGNIFICANT CHANGE UP (ref 3.5–5.3)
POTASSIUM SERPL-SCNC: 4 MMOL/L — SIGNIFICANT CHANGE UP (ref 3.5–5.3)
PROT SERPL-MCNC: 6.6 G/DL — SIGNIFICANT CHANGE UP (ref 6–8.3)
PROTHROM AB SERPL-ACNC: 11.6 SEC — SIGNIFICANT CHANGE UP (ref 9.9–13.4)
RBC # BLD: 4.25 M/UL — SIGNIFICANT CHANGE UP (ref 3.8–5.2)
RBC # FLD: 14.5 % — SIGNIFICANT CHANGE UP (ref 10.3–14.5)
SODIUM SERPL-SCNC: 133 MMOL/L — LOW (ref 135–145)
TROPONIN T, HIGH SENSITIVITY RESULT: 16 NG/L — SIGNIFICANT CHANGE UP (ref 0–51)
TROPONIN T, HIGH SENSITIVITY RESULT: 19 NG/L — SIGNIFICANT CHANGE UP (ref 0–51)
WBC # BLD: 5.86 K/UL — SIGNIFICANT CHANGE UP (ref 3.8–10.5)
WBC # FLD AUTO: 5.86 K/UL — SIGNIFICANT CHANGE UP (ref 3.8–10.5)

## 2025-04-14 PROCEDURE — 74174 CTA ABD&PLVS W/CONTRAST: CPT | Mod: 26

## 2025-04-14 PROCEDURE — 71275 CT ANGIOGRAPHY CHEST: CPT | Mod: 26

## 2025-04-14 PROCEDURE — 93010 ELECTROCARDIOGRAM REPORT: CPT

## 2025-04-14 PROCEDURE — 99285 EMERGENCY DEPT VISIT HI MDM: CPT | Mod: FS

## 2025-04-14 RX ORDER — ASPIRIN 325 MG
1 TABLET ORAL
Refills: 0 | DISCHARGE

## 2025-04-14 RX ORDER — AMLODIPINE BESYLATE 10 MG/1
1 TABLET ORAL
Refills: 0 | DISCHARGE

## 2025-04-14 RX ORDER — ATORVASTATIN CALCIUM 80 MG/1
40 TABLET, FILM COATED ORAL AT BEDTIME
Refills: 0 | Status: DISCONTINUED | OUTPATIENT
Start: 2025-04-14 | End: 2025-04-17

## 2025-04-14 RX ORDER — ATORVASTATIN CALCIUM 80 MG/1
1 TABLET, FILM COATED ORAL
Refills: 0 | DISCHARGE

## 2025-04-14 RX ORDER — ASPIRIN 325 MG
81 TABLET ORAL DAILY
Refills: 0 | Status: DISCONTINUED | OUTPATIENT
Start: 2025-04-14 | End: 2025-04-17

## 2025-04-14 RX ORDER — AMLODIPINE BESYLATE 10 MG/1
5 TABLET ORAL DAILY
Refills: 0 | Status: DISCONTINUED | OUTPATIENT
Start: 2025-04-14 | End: 2025-04-17

## 2025-04-14 RX ORDER — INFLUENZA A VIRUS A/IDAHO/07/2018 (H1N1) ANTIGEN (MDCK CELL DERIVED, PROPIOLACTONE INACTIVATED, INFLUENZA A VIRUS A/INDIANA/08/2018 (H3N2) ANTIGEN (MDCK CELL DERIVED, PROPIOLACTONE INACTIVATED), INFLUENZA B VIRUS B/SINGAPORE/INFTT-16-0610/2016 ANTIGEN (MDCK CELL DERIVED, PROPIOLACTONE INACTIVATED), INFLUENZA B VIRUS B/IOWA/06/2017 ANTIGEN (MDCK CELL DERIVED, PROPIOLACTONE INACTIVATED) 15; 15; 15; 15 UG/.5ML; UG/.5ML; UG/.5ML; UG/.5ML
0.5 INJECTION, SUSPENSION INTRAMUSCULAR ONCE
Refills: 0 | Status: DISCONTINUED | OUTPATIENT
Start: 2025-04-14 | End: 2025-04-17

## 2025-04-14 RX ADMIN — ATORVASTATIN CALCIUM 40 MILLIGRAM(S): 80 TABLET, FILM COATED ORAL at 22:03

## 2025-04-14 NOTE — ED ADULT NURSE NOTE - NSFALLHARMRISKINTERV_ED_ALL_ED

## 2025-04-14 NOTE — ED PROVIDER NOTE - CARE PLAN
Essential hypertension  Continue lisinopril and metoprolol  1 Principal Discharge DX:	Shortness of breath

## 2025-04-14 NOTE — H&P ADULT - NSHPPOASURGSITEINCISION_GEN_ALL_CORE
Patient's Name: Aneta Huerta  MRN: <[id number]>  YOB: 1948  Date of Visit: 4/21/2021  Primary Care Provider: Dami Palacios MD  Referring Provider: No ref. provider found    Subjective:     Chief Complaint   Patient presents with    Skin Exam     spots on face        History of Present Illness:  Aneta Huerta a 68 y.o. male with h/o NMSC and Aks is a follow up patient to my practice. They were last seen in clinic on 6/10/20     Denies any changing, bleeding, painful, or itching lesions. He is concerned about the following spot/s that he would like checked:     Signs and symptoms: scaly and rough. Location: face. Duration: This condition has gone on for a few months  Current treatment: None  Previous treatment: PDT    Patient reports  a personal history of skin cancer. Patient denies  a personal history of melanoma. Patient denies  a family history of skin cancer. Patient reports  a history of blistering sunburns. Patient denies  a history of tanning bed use. Patient currently is compliant with using sun-protective measures.     Past medical/surgical/family history reviewed with no changes since last visit on 6/10/20        Allergies:  No Known Allergies    Current Medications:  Current Outpatient Medications   Medication Sig Dispense Refill    levothyroxine (SYNTHROID) 125 MCG tablet TAKE 1 TABLET BY MOUTH EVERY DAY 90 tablet 3    ketorolac (TORADOL) 10 MG tablet Take 1 tablet by mouth every 6 hours as needed for Pain 12 tablet 0    ondansetron (ZOFRAN ODT) 4 MG disintegrating tablet Take 1 tablet by mouth every 8 hours as needed for Nausea 20 tablet 0    tamsulosin (FLOMAX) 0.4 MG capsule Take 1 capsule by mouth daily for 5 doses 5 capsule 0    sildenafil (VIAGRA) 100 MG tablet Take 1 tablet by mouth as needed for Erectile Dysfunction (Patient not taking: Reported on 6/10/2020) 10 tablet 3    naproxen (NAPROSYN) 500 MG tablet Take 1 tablet by mouth 2 times daily (with meals) 60 tablet 2     No current facility-administered medications for this visit. Review of Systems:  Constitutional: No fevers, chills or recent illness. Skin: Skin:As per HPI AND otherwise no new, bleeding or symptomatic skin lesions      Objective:     Vitals:    04/21/21 1037   Temp: 97.2 °F (36.2 °C)       Physical Examination:  General: alert, comfortable, no apparent distress, well-appearing  Psych: alert, oriented and pleasant  Neuro: oriented to person, place, and time  Skin: Areas examined: head including face, lips, conjunctiva and lids, neck, hair/scalp, chest, including breasts and axilla, abdomen, back, buttocks, right upper extremity, left upper extremity, right lower extremity, left lower extremity, left hand, right hand, digits and nails, Right foot, Left foot and toe nails      All areas examined were within normal limits except those listed below with the appropriate assessment and plan    Assessment and Plan (with relevant objective exam findings):     1. Actinic Keratosis    Objective findings: erythematous, gritty, scaly keratotic macule (s), papules with slight hyperkeratosis located on: scalp, forehead, temples, cheeks, nose and Rt helix and tragus    The patient was told that actinic keratosis(es) represent precancers of the skin that have a small chance of developing into squamous cell carcinoma. Discussed options for therapy and risks/benefits and alternatives with patient including Liquid nitrogen, photodynamic therapy, topical field therapy (fluorouracil, imiquimod, other), etc     Decision was made to do the following:    -Photodynamic therapy, consult sent for blue light. Use in the following areas: face (forehead, temples, cheeks), scalp and Rt ear including tragus  See orders for details. Follow up 3 weeks after second round of therapy. 2.  Scar conditions and fibrosis of skin.  H/o NMSC s/p Mohs and excision   Location: nasal dorsum, Rt lateral scapula and Rt upper medial chest  Objective findings: linear well healed surgical scars with no signs of recurrence   Scars from excisional sites should be monitored for any recurrences. Observation for any changes recommended     3. Solar Lentiginosis  Location: sun exposed areas, most prominently on the dorsal hands, face, forearms, neck, shoulders, upper chest and upper back      Objective: Numerous lacy brown macules ranging in size from 2-10 mm diameter. The lentigines seen today are benign in character, caused by the sun, and do not require treatment. However, they do occasionally transform into a malignancy, so the patient needs to monitor for changes. They were educated on what a suspicious change would include, change in size or color, and included education on the ABCDs of melanoma. 4. Seborrheic Keratosis  Location: chest, Rt great plantar toe medially and back  Objective: Waxy, stuck on keratotic brown  papules. - Discussed the benign nature of these lesions and that there is no malignant potential.  Treatment is destructive or removal which would be considered cosmetic and not covered by insurance. Treatment today: reassurance provided. 5. Dilated pore nancie    Location:  Upper mid back  Objective findings: A solitary, enlarged pore. The large poral opening is occluded by a keratin plug. The surrounding skin is normal in appearance without inflammation or induration. Discussed with the patient that dilated pores are common on the upper trunk, especially the upper back. 6. Multiple benign melanocytic nevi   Location: back  Objective findings: multiple brown/pink macules and papules without abnormal findings or concerning findings on exam and dermatoscopy    -Counseled the patient that these are benign and need no therapy. Observation for any changes recommended       7.  Roper Hemangiomas  Location: shoulders, arms, chest, abdomen, back and legs    Objective findings: Multiple bright red, no

## 2025-04-14 NOTE — H&P ADULT - HISTORY OF PRESENT ILLNESS
89-year-old female with pmhx  htn, hld, COPD, AAA (4.4 cm distal AAA on imaging from 09/2024) p/w Shortness of breath and chest heaviness since last night.    Patient reports Shortness of breath since yesterday which woke her  from sleep, associated with chest pressure.  No hx palpitations/ abdominal pain/ nausea/ vomiting.     Patient endorses having chronic cough, smokes 1/2 pack/ day.      Denies fever/chills, abdominal pain, n/v/d, back pain, numbness/tingling, dizziness/lightheadedness, recent travel, recent sick contacts.

## 2025-04-14 NOTE — ED PROVIDER NOTE - CLINICAL SUMMARY MEDICAL DECISION MAKING FREE TEXT BOX
attending Osman: 89yF h/o HTN, HLD, COPD, AAA (4.4 cm distal AAA on imaging from 09/2024) p/w SOB, chest heaviness since last night.  Symptoms woke her from sleep, described as "feeling like I cannot catch my breath".  Exam as above. Will review prior EMR visits, obtain ekg, place on tele, labs including trop, CTA eval aorta, likely admit

## 2025-04-14 NOTE — PATIENT PROFILE ADULT - FALL HARM RISK - HARM RISK INTERVENTIONS

## 2025-04-14 NOTE — ED ADULT NURSE NOTE - OBJECTIVE STATEMENT
PT is an 89 year old A&OX4 female with PMH of left-sided mastectomy, HTN, HLD, COPD, and AAA (4.4 cm distal AAA on imaging from 09/2024) who presents to the ED from home with c/o SOB and chest heaviness last night. PT states her symptoms woke her up from sleep, and she described the feeling as if "I cannot catch my breath. PT states yesterday she was feeling generally unwell. PT states she has a chronic productive cough that is unchanged from previous. PT denies N/V/D, dizziness, fevers/chills, chest pain, abdominal pain, numbness/tingling, recent travel, and sick contacts. PT is resting comfortably in bed, breathing unlabored on room air, and speaking in complete sentences. Abdomen is soft, non-tender, and non-distended. Skin is warm and dry, no diaphoresis noted. No edema noted to B/L extremities. Strong strength in B/L extremities, sensation intact. IV access established 20G in right forearm. PT placed in hospital gown. EKG completed in triage, PT placed on cardiac monitor. PT ambulatory with steady gait. Safety and comfort maintained. Family at the bedside.

## 2025-04-14 NOTE — ED PROVIDER NOTE - PROGRESS NOTE DETAILS
CTA shows known partially thrombosed AAA.  Additionally noted on lab work is elevation of proBNP, patient with no history of heart failure and at this time does not appear fluid overloaded so we will hold off on any diuretic therapy.  Admitted to Dr. Jose for further cardiac workup. - Bi Bustamante PA-C

## 2025-04-14 NOTE — H&P ADULT - ASSESSMENT
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 No aortic dissection or intramural   hematoma.   Renal lesion     Dyspnea on Exertion likely cardiac patient wheezing   Tele, CE   Followup Echo   Cards consult called.  Follow up recs     Renal lesion   MRI abdomen       COPD Nebs   AAA stable   HTN Ramipril     Hx Smoking   Smoking Cessation

## 2025-04-14 NOTE — ED ADULT TRIAGE NOTE - HEIGHT IN FEET
Photo Preface (Leave Blank If You Do Not Want): Photographs were obtained today;\\nPre-operative, Closure Detail Level: Zone 5

## 2025-04-14 NOTE — ED PROVIDER NOTE - OBJECTIVE STATEMENT
89-year-old female PMHx HTN, HLD, COPD, AAA (4.4 cm distal AAA on imaging from 09/2024) presents to the ED c/o shortness if breath and chest heaviness since last night.  Symptoms woke her from sleep, described as "feeling like I cannot catch my breath".  Reports yesterday she was feeling overall unwell as well but did not have the sensations until overnight last night into the morning.  Does have chronic productive cough, unchanged from previous.  Denies fever/chills, abdominal pain, n/v/d, back pain, numbness/tingling, dizziness/lightheadedness, recent travel, recent sick contacts. 89-year-old female PMHx HTN, HLD, COPD, AAA (4.4 cm distal AAA on imaging from 09/2024) presents to the ED c/o shortness if breath and chest heaviness since last night.  Symptoms woke her from sleep, described as "feeling like I cannot catch my breath".  Reports yesterday she was feeling overall unwell but did not have the sensations until overnight last night into the morning.  Does have chronic productive cough, unchanged from previous.  Denies fever/chills, abdominal pain, n/v/d, back pain, numbness/tingling, dizziness/lightheadedness, recent travel, recent sick contacts.

## 2025-04-15 PROCEDURE — 74183 MRI ABD W/O CNTR FLWD CNTR: CPT | Mod: 26

## 2025-04-15 RX ADMIN — Medication 81 MILLIGRAM(S): at 11:14

## 2025-04-15 RX ADMIN — ATORVASTATIN CALCIUM 40 MILLIGRAM(S): 80 TABLET, FILM COATED ORAL at 21:07

## 2025-04-15 RX ADMIN — AMLODIPINE BESYLATE 5 MILLIGRAM(S): 10 TABLET ORAL at 05:00

## 2025-04-15 NOTE — CONSULT NOTE ADULT - TIME BILLING
89-year-old female with pmhx  htn, hld, COPD, AAA (4.4 cm distal AAA on imaging from 09/2024) p/w Shortness of breath and chest heaviness since last night.      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 No aortic dissection or intramural hematoma.    #Renal lesion   -CTA shows an indeterminate 1.5 cm right renal lesion  -MRI Abdomen pending  -med f/u     COPD   -management per med     #HTN  -cont amlodipine     #HLD  -cont statin     dvt ppx    Follows with Dr. Alves cards

## 2025-04-15 NOTE — CONSULT NOTE ADULT - ASSESSMENT
A/P  89-year-old female with pmhx  htn, hld, COPD, AAA (4.4 cm distal AAA on imaging from 09/2024) p/w Shortness of breath and chest heaviness since last night.      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 No aortic dissection or intramural hematoma.    #Renal lesion   -CTA shows an indeterminate 1.5 cm right renal lesion  -MRI Abdomen pending  -med f/u     COPD   -management per med     #HTN  -cont amlodipine     #HLD  -cont statin     dvt ppx           A/P  89-year-old female with pmhx  htn, hld, COPD, AAA (4.4 cm distal AAA on imaging from 09/2024) p/w Shortness of breath and chest heaviness since last night.      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 No aortic dissection or intramural hematoma.    #Renal lesion   -CTA shows an indeterminate 1.5 cm right renal lesion  -MRI Abdomen pending  -med f/u     COPD   -management per med     #HTN  -cont amlodipine     #HLD  -cont statin     dvt ppx    Follows with Dr. Alves cards

## 2025-04-15 NOTE — CONSULT NOTE ADULT - SUBJECTIVE AND OBJECTIVE BOX
CARDIOLOGY CONSULT - Dr. Lainez     Patient Name: MOR SOSA    Date of Service: 04-15-25 @ 12:50    Patient seen and examined    HPI:  89-year-old female with pmhx  htn, hld, COPD, AAA (4.4 cm distal AAA on imaging from 09/2024) p/w Shortness of breath and chest heaviness since last night.    Patient reports Shortness of breath since yesterday which woke her  from sleep, associated with chest pressure.  No hx palpitations/ abdominal pain/ nausea/ vomiting.     Patient endorses having chronic cough, smokes 1/2 pack/ day.      Denies fever/chills, abdominal pain, n/v/d, back pain, numbness/tingling, dizziness/lightheadedness, recent travel, recent sick contacts. (14 Apr 2025 21:05)      PAST MEDICAL & SURGICAL HISTORY:  Breast CA      Hypertension      Hyperlipidemia      Osteoporosis      Seasonal allergies      AAA (abdominal aortic aneurysm)  stable      TB (tuberculosis)  as a child      Chronic obstructive pulmonary disease      Nicotine dependence      Macular degeneration      HTN (hypertension)      HLD (hyperlipidemia)      History of weight loss      Left wrist fracture  s/p ORIF dec 2013      Port-a-cath in place  [place] 8/4/2014      H/O left breast biopsy  6/2014      History of cataract extraction  bilateral      H/O mastectomy              PREVIOUS DIAGNOSTIC TESTING:    [x] Echocardiogram:  < from: TTE W or WO Ultrasound Enhancing Agent (01.11.24 @ 06:41) >  CONCLUSIONS:      1. Left ventricular cavity is small. Left ventricular wall thickness is normal. Left ventricular systolic function is normal with an ejection fraction of 73 % by Cortes's method of disks. There are no regional wall motion abnormalities seen.   2. Left ventricular global longitudinal strain is -21.7 % which is normal (<-18%). Images were acquired on a [employer] ultrasound system and processed using [employer] strain analysis software with a heart rate of 62 bpm and a blood pressure of 166/66 mmHg.   3. Normal right ventricular cavity size, wall thickness, and normal systolic function.   4. There is mild (grade 1) left ventricular diastolic dysfunction.   5. Normal left and right atrial size.   6. No significant valvular disease.   7. There is trace tricuspid regurgitation. Estimated pulmonary artery systolic pressure is 32 mmHg.   8. No pericardial effusion seen.   9. No prior echocardiogram is available for comparison.      < end of copied text >    [ ]  Catheterization:  [x] Stress Test:  	  < from: Nuclear Stress Test-Pharmacologic.. (01.11.24 @ 10:42) >  Conclusions:   1. Normal myocardial perfusion scan, with no evidence of infarctionor inducible ischemia.   2. The left ventricular EF% during stress is 55 %. The stress end diastolic volume is 62 ml and systolic volume is 34 ml.    ---------------------------------------------------------------------------------------------------------------------------------------------------------    < end of copied text >      MEDICATIONS:  Home Medications:  amLODIPine 5 mg oral tablet: 1 tab(s) orally once a day (14 Apr 2025 16:09)  aspirin 81 mg oral delayed release tablet: 1 tab(s) orally once a day (14 Apr 2025 16:09)  atorvastatin 40 mg oral tablet: 1 tab(s) orally once a day (14 Apr 2025 16:09)  Bystolic 5 mg oral tablet: 1 tab(s) orally once a day (14 Apr 2025 16:09)      MEDICATIONS  (STANDING):  amLODIPine   Tablet 5 milliGRAM(s) Oral daily  aspirin enteric coated 81 milliGRAM(s) Oral daily  atorvastatin 40 milliGRAM(s) Oral at bedtime  influenza  Vaccine (HIGH DOSE) 0.5 milliLiter(s) IntraMuscular once      FAMILY HISTORY:  Family history of diabetes mellitus (DM) (Sibling)    Family history of heart disease (Sibling)    FH: heart disease (Sibling)        SOCIAL HISTORY:    [ ] Non-smoker  [x] Smoker  [ ] Alcohol    Allergies    lisinopril (Angioedema)    Intolerances    	    REVIEW OF SYSTEMS:  CONSTITUTIONAL: No fever, weight loss, or fatigue  EYES: No eye pain, visual disturbances, or discharge  ENMT:  No difficulty hearing, tinnitus, vertigo; No sinus or throat pain  NECK: No pain or stiffness  RESPIRATORY: No cough, wheezing, chills or hemoptysis; +Shortness of Breath  CARDIOVASCULAR: +chest heaviness, No palpitations, passing out, dizziness, or leg swelling  GASTROINTESTINAL: No abdominal or epigastric pain. No nausea, vomiting, or hematemesis; No diarrhea or constipation. No melena or hematochezia.  GENITOURINARY: No dysuria, frequency, hematuria, or incontinence  NEUROLOGICAL: No headaches, memory loss, loss of strength, numbness, or tremors  SKIN: No itching, burning, rashes, or lesions   	    [x] All others negative	  [ ] Unable to obtain    PHYSICAL EXAM:  T(C): 36.9 (04-15-25 @ 04:44), Max: 36.9 (04-14-25 @ 16:23)  HR: 66 (04-15-25 @ 04:44) (56 - 67)  BP: 148/72 (04-15-25 @ 04:44) (137/62 - 149/72)  RR: 18 (04-15-25 @ 04:44) (18 - 20)  SpO2: 96% (04-15-25 @ 04:44) (94% - 98%)  Wt(kg): --  I&O's Summary      Appearance: Normal	  Psychiatry: A & O x 3, Mood & affect appropriate  HEENT:   Normal oral mucosa, PERRL, EOMI	  Lymphatic: No lymphadenopathy  Cardiovascular: Normal S1 S2,RRR, No JVD, No murmurs  Respiratory: Lungs clear to auscultation b/l  Gastrointestinal:  Soft, Non-tender, + BS	  Skin: No rashes, No ecchymoses, No cyanosis	  Neurologic: Non-focal  Extremities: Normal range of motion, No clubbing, cyanosis or edema  Vascular: Peripheral pulses palpable 2+ bilaterally    TELEMETRY: 	  NSR with PVCs   ECG:  	NSR with PACs HR 63 with no acute ischemic changes  RADIOLOGY:  < from: CT Angio Chest Aorta w/wo IV Cont (04.14.25 @ 13:08) >    IMPRESSION:  Partially thrombosed aneurysm of the infrarenal abdominal aorta measuring   4.8 x 4.6 cm in maximum dimension. No aortic dissection or intramural   hematoma.    There is an indeterminate 1.5 cm right renal lesion, for which dedicated   MR abdomen can be obtained for further assessment if clinically indicated.      < end of copied text >    < from: CT Angio Abdomen and Pelvis w/ IV Cont (04.14.25 @ 13:08) >  IMPRESSION:  Partially thrombosed aneurysm of the infrarenal abdominal aorta measuring   4.8 x 4.6 cm in maximum dimension. No aortic dissection or intramural   hematoma.    There is an indeterminate 1.5 cm right renal lesion, for which dedicated   MR abdomen can be obtained for further assessment if clinically indicated.      < end of copied text >    OTHER: 	  	  LABS:	 	    CARDIAC MARKERS:  Troponin T, High Sensitivity Result: 16 ng/L (04-14 @ 12:44)  Troponin T, High Sensitivity Result: 19 ng/L (04-14 @ 09:52)                                  12.7   5.86  )-----------( 190      ( 14 Apr 2025 09:52 )             38.6     04-14    133[L]  |  98  |  14  ----------------------------<  97  4.0   |  21[L]  |  0.67    Ca    8.5      14 Apr 2025 09:52  Mg     1.8     04-14    TPro  6.6  /  Alb  3.8  /  TBili  0.5  /  DBili  x   /  AST  33  /  ALT  33  /  AlkPhos  76  04-14    PT/INR - ( 14 Apr 2025 09:52 )   PT: 11.6 sec;   INR: 1.02 ratio         PTT - ( 14 Apr 2025 09:52 )  PTT:29.9 sec  proBNP:   Lipid Profile:   HgA1c:   TSH:

## 2025-04-16 ENCOUNTER — RESULT REVIEW (OUTPATIENT)
Age: 89
End: 2025-04-16

## 2025-04-16 ENCOUNTER — TRANSCRIPTION ENCOUNTER (OUTPATIENT)
Age: 89
End: 2025-04-16

## 2025-04-16 LAB
ALBUMIN SERPL ELPH-MCNC: 3.8 G/DL — SIGNIFICANT CHANGE UP (ref 3.3–5)
ALP SERPL-CCNC: 77 U/L — SIGNIFICANT CHANGE UP (ref 40–120)
ALT FLD-CCNC: 46 U/L — HIGH (ref 10–45)
ANION GAP SERPL CALC-SCNC: 15 MMOL/L — SIGNIFICANT CHANGE UP (ref 5–17)
AST SERPL-CCNC: 41 U/L — HIGH (ref 10–40)
BILIRUB SERPL-MCNC: 0.5 MG/DL — SIGNIFICANT CHANGE UP (ref 0.2–1.2)
BUN SERPL-MCNC: 13 MG/DL — SIGNIFICANT CHANGE UP (ref 7–23)
CALCIUM SERPL-MCNC: 8.3 MG/DL — LOW (ref 8.4–10.5)
CHLORIDE SERPL-SCNC: 100 MMOL/L — SIGNIFICANT CHANGE UP (ref 96–108)
CO2 SERPL-SCNC: 20 MMOL/L — LOW (ref 22–31)
CREAT SERPL-MCNC: 0.72 MG/DL — SIGNIFICANT CHANGE UP (ref 0.5–1.3)
EGFR: 80 ML/MIN/1.73M2 — SIGNIFICANT CHANGE UP
EGFR: 80 ML/MIN/1.73M2 — SIGNIFICANT CHANGE UP
GLUCOSE SERPL-MCNC: 163 MG/DL — HIGH (ref 70–99)
MAGNESIUM SERPL-MCNC: 1.8 MG/DL — SIGNIFICANT CHANGE UP (ref 1.6–2.6)
POTASSIUM SERPL-MCNC: 3.8 MMOL/L — SIGNIFICANT CHANGE UP (ref 3.5–5.3)
POTASSIUM SERPL-SCNC: 3.8 MMOL/L — SIGNIFICANT CHANGE UP (ref 3.5–5.3)
PROT SERPL-MCNC: 6.6 G/DL — SIGNIFICANT CHANGE UP (ref 6–8.3)
SODIUM SERPL-SCNC: 135 MMOL/L — SIGNIFICANT CHANGE UP (ref 135–145)

## 2025-04-16 PROCEDURE — 93306 TTE W/DOPPLER COMPLETE: CPT | Mod: 26

## 2025-04-16 RX ORDER — BISACODYL 5 MG
5 TABLET, DELAYED RELEASE (ENTERIC COATED) ORAL AT BEDTIME
Refills: 0 | Status: DISCONTINUED | OUTPATIENT
Start: 2025-04-16 | End: 2025-04-17

## 2025-04-16 RX ORDER — POLYETHYLENE GLYCOL 3350 17 G/17G
17 POWDER, FOR SOLUTION ORAL DAILY
Refills: 0 | Status: DISCONTINUED | OUTPATIENT
Start: 2025-04-16 | End: 2025-04-16

## 2025-04-16 RX ADMIN — ATORVASTATIN CALCIUM 40 MILLIGRAM(S): 80 TABLET, FILM COATED ORAL at 21:20

## 2025-04-16 RX ADMIN — Medication 5 MILLIGRAM(S): at 21:20

## 2025-04-16 RX ADMIN — Medication 81 MILLIGRAM(S): at 12:13

## 2025-04-16 RX ADMIN — AMLODIPINE BESYLATE 5 MILLIGRAM(S): 10 TABLET ORAL at 06:27

## 2025-04-16 NOTE — PROGRESS NOTE ADULT - TIME BILLING
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 No aortic dissection or intramural hematoma recommend vascular sx followup  TTE shows normal LVSF with EF of 65-70%; no reg WMA; nml RV sys fx; mild to mod TR; mild MR  cont ASA, statin  can pursue stress test as outpt

## 2025-04-16 NOTE — CHART NOTE - NSCHARTNOTEFT_GEN_A_CORE
RD CHF education chart note    Patient was visited by RD for CHF education. Heart failure education provided to the patient/daughter in detail. Discussed heart failure nutrition therapy, sodium and fluid intake, importance of diet adherence, daily weights monitoring with the patient. Reinforced importance of weight gain parameters and importance of contacting MD’s about weight changes. Provided handouts on heart failure nutrition therapy, reading heart healthy nutrition labels, heart healthy shopping tips and low sodium food list. Patient/daughter verbalized understanding demonstrated by teach back method. RD contact information left with patient for any future questioning. RD CHF education chart note    Patient was visited by RD for CHF education. Heart failure education provided to the patient/daughter in detail. Discussed heart failure nutrition therapy, sodium and fluid intake, importance of diet adherence, daily weights monitoring with the patient. Reinforced importance of weight gain parameters and importance of contacting MD’s about weight changes. Provided handouts on heart failure nutrition therapy, reading heart healthy nutrition labels, heart healthy shopping tips and low sodium food list. Patient/daughter verbalized understanding demonstrated by teach back method. RD contact information left with patient for any future questioning. Assessment not indicated at this time-plan for pt to be discharged today.

## 2025-04-16 NOTE — DISCHARGE NOTE PROVIDER - PROVIDER TOKENS
FREE:[LAST:[Victorina],FIRST:[Hsin],PHONE:[phone]],FAX:[(   )    -],ADDRESS:[[address] [place] [address]]],PROVIDER:[TOKEN:[[id number]]] PROVIDER:[TOKEN:[[id number]]],FREE:[LAST:[Victorina],FIRST:[Hsin],PHONE:[phone]],FAX:[(   )    -],ADDRESS:[[address] [place] [address]]],PROVIDER:[TOKEN:[[id number]],FOLLOWUP:[1 week]]

## 2025-04-16 NOTE — DISCHARGE NOTE PROVIDER - NSDCFUADDAPPT_GEN_ALL_CORE_FT
APPTS ARE READY TO BE MADE: [X] YES    Best Family or Patient Contact (if needed):    Additional Information about above appointments (if needed):    1: Cardiology   2:   3:     Other comments or requests:    APPTS ARE READY TO BE MADE: [X] YES    Best Family or Patient Contact (if needed):    Additional Information about above appointments (if needed):    1: Cardiology/ HF  2: Pulmonary  3: Nephrology     Other comments or requests:    APPTS ARE READY TO BE MADE: [X] YES    Best Family or Patient Contact (if needed):    Additional Information about above appointments (if needed):    1: Cardiology/ HF  2: Pulmonary  3: Nephrology     Other comments or requests:     Patient advised they did not want to proceed with scheduling appointments with the providers on their referrals. They will coordinate care on their own.

## 2025-04-16 NOTE — DISCHARGE NOTE PROVIDER - NSFOLLOWUPCLINICSTOKEN_GEN_ALL_ED_FT
051781:1-3 days|| ||00\01||False; 116041:1-3 days|| ||00\01||False;192064:1-3 days|| ||00\01||False;286014:1-3 days|| ||00\01||False;

## 2025-04-16 NOTE — DISCHARGE NOTE PROVIDER - NSFOLLOWUPCLINICS_GEN_ALL_ED_FT
[facility] Cardiology  Cardiology  [place]   Phone:   Fax:   Follow Up Time: 1-3 days     [facility]  [place]  [place]   Phone:   Fax:   Follow Up Time: 1-3 days    [facility] - HF  Cardiology  [facility] [place], [place]   Phone:   Fax:   Follow Up Time: 1-3 days    [facility]  Pulmonary  [place]   Phone:   Fax:   Follow Up Time: 1-3 days

## 2025-04-16 NOTE — DISCHARGE NOTE PROVIDER - NSDCCPCAREPLAN_GEN_ALL_CORE_FT
PRINCIPAL DISCHARGE DIAGNOSIS  Diagnosis: Shortness of breath  Assessment and Plan of Treatment: You presented to the emergency department with shortness of breath and chest pressure which resolved during hospital stay. You had a cardiac workup which included a CTA of your chest, EKG noted without ischemic changes, normal cardiac enzymes and an Echocardiogram which showed an EF of 65-70%. Cardiology deemed you stable for discharge with outpatient cardiology follow up to have a stress test.      SECONDARY DISCHARGE DIAGNOSES  Diagnosis: Renal lesion  Assessment and Plan of Treatment: A renal lesion was found on CT and an MRI was done to better evaluate the lesion, findings are as follows.  Right renal medial upper pole cystic lesion with thin enhancing septations (Bosniak IIF), 1.4 cm.   Additional bilateral renal cysts, some of which are   hemorrhagic/proteinaceous. Indeterminate right renal lower pole  lesion   noted on prior CT corresponds to a hemorrhagic/proteinaceous cyst.  The recommendation is to follow up for repeat imaging in 6 months.

## 2025-04-16 NOTE — DISCHARGE NOTE PROVIDER - NSDCMRMEDTOKEN_GEN_ALL_CORE_FT
amLODIPine 5 mg oral tablet: 1 tab(s) orally once a day  aspirin 81 mg oral delayed release tablet: 1 tab(s) orally once a day  atorvastatin 40 mg oral tablet: 1 tab(s) orally once a day  Bystolic 5 mg oral tablet: 1 tab(s) orally once a day

## 2025-04-16 NOTE — DISCHARGE NOTE PROVIDER - CARE PROVIDERS DIRECT ADDRESSES
,DirectAddress_Unknown,[email or web address],DirectAddress_Unknown,[email or web address]

## 2025-04-16 NOTE — DISCHARGE NOTE PROVIDER - CARE PROVIDER_API CALL
Ingrid Prajapati  [address] [place] [address]  Phone: [phone]  Fax: (   )    -  Follow Up Time:     Kaushal Forrest  Cardiovascular Disease  [address] [place] [address], Room [address] [address]  Phone: [phone]  Fax: [phone]  Follow Up Time:    Kaushal Forrest  Cardiovascular Disease  [address] [place] [address], [place] [address]  Phone: [phone]  Fax: [phone]  Follow Up Time:     Ingrid Prajapati  [address] [place] [address]  Phone: [phone]  Fax: (   )    -  Follow Up Time:     Anastacia Nye  Urology  [address] [facility], [address]  [place] [address]  Phone: [phone]  Fax: [phone]  Follow Up Time: 1 week

## 2025-04-16 NOTE — DISCHARGE NOTE PROVIDER - HOSPITAL COURSE
HPI:  89-year-old female with pmhx  htn, hld, COPD, AAA (4.4 cm distal AAA on imaging from 2024) p/w Shortness of breath and chest heaviness since last night.    Patient reports Shortness of breath since yesterday which woke her  from sleep, associated with chest pressure.  No hx palpitations/ abdominal pain/ nausea/ vomiting.     Patient endorses having chronic cough, smokes 1/2 pack/ day.      Denies fever/chills, abdominal pain, n/v/d, back pain, numbness/tingling, dizziness/lightheadedness, recent travel, recent sick contacts. (2025 21:05)      Hospital Course:    Weight - Discharge/Trend:  Weight in k.6 (25 @ 04:48)  Weight in k.2 (04-15-25 @ 04:44)      Documented EF:     Guideline Directed Medical Therapy Prescribed/Reason why not prescribed:  B-Blocker:   ARNI/ACE-I/ARB:  MRA:  SGLT2 inhibitor:     Appointment scheduled within 7 days?  [ ] YES [ ] NO    Active or Pending Issues Requiring Follow-up:    Advanced Directives:   [ ] Full code  [ ] DNR  [ ] Hospice    Discharge Diagnoses:  (Please specify acuity, type of heart failure, and if there was renal involvement)     HPI:  89-year-old female with pmhx HTN, HLD, COPD, AAA (4.4 cm distal AAA on imaging from 2024) p/w Shortness of breath and chest heaviness since last night.    Patient reports Shortness of breath since yesterday which woke her  from sleep, associated with chest pressure.  No hx palpitations/ abdominal pain/ nausea/ vomiting.     Patient endorses having chronic cough, smokes 1/2 pack/ day.     Denies fever/chills, abdominal pain, n/v/d, back pain, numbness/tingling, dizziness/lightheadedness, recent travel, recent sick contacts. (2025 21:05)      Hospital Course:  89-year-old female with pmhx  htn, hld, COPD, AAA (4.4 cm distal AAA on imaging from 2024) p/w Shortness of breath and chest heaviness x 1 day. Symptoms woke pt from sleep, lasted a few hours and resolved with no further recurrence of CP or SOB. In ED HST x2 negative; ECG with no acute ischemic changes. No decomp HF on exam. 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 No aortic dissection or intramural hematoma. TTE shows normal LVSF with EF of 65-70%; no reg WMA; nml RV sys fx; mild to mod TR; mild MR per cardiology patient can continue ASA and statin and is stable to pursue stress test as outpt.  Patient incidentally found to have a renal lesion on CT; MRI abdomen done noted with right renal medial upper pole cystic lesion with thin enhancing   septations (Bosniak IIF), 1.4 cm. Consider follow-up in 6 months. Additional bilateral renal cysts, some of which are hemorrhagic/proteinaceous. Indeterminate right renal lower pole  lesion noted on prior CT corresponds to a hemorrhagic/proteinaceous cyst. Patient instructed to follow up with PMD for recommended repeat monitoring. Patient stable for discharge per medicine and cards.     Weight - Discharge/Trend:  Weight in k.6 (25 @ 04:48)  Weight in k.2 (04-15-25 @ 04:44)    Documented EF: 65%-70%    Guideline Directed Medical Therapy Prescribed/Reason why not prescribed:  B-Blocker:   ARNI/ACE-I/ARB:  MRA:  SGLT2 inhibitor:     Appointment scheduled within 7 days?  [ ] YES [ ] NO    Active or Pending Issues Requiring Follow-up:  Dr. Ingrid Forrest   Advanced Directives:   [ X] Full code  [ ] DNR  [ ] Hospice    Discharge Diagnoses:  Dyspnea, HFpEF   (Please specify acuity, type of heart failure, and if there was renal involvement)     HPI:  89-year-old female with pmhx HTN, HLD, COPD, AAA (4.4 cm distal AAA on imaging from 2024) p/w Shortness of breath and chest heaviness since last night.    Patient reports Shortness of breath since yesterday which woke her  from sleep, associated with chest pressure.  No hx palpitations/ abdominal pain/ nausea/ vomiting.     Patient endorses having chronic cough, smokes 1/2 pack/ day.     Denies fever/chills, abdominal pain, n/v/d, back pain, numbness/tingling, dizziness/lightheadedness, recent travel, recent sick contacts. (2025 21:05)      Hospital Course:  89-year-old female with pmhx  htn, hld, COPD, AAA (4.4 cm distal AAA on imaging from 2024) p/w Shortness of breath and chest heaviness x 1 day. Symptoms woke pt from sleep, lasted a few hours and resolved with no further recurrence of CP or SOB. In ED HST x2 negative; ECG with no acute ischemic changes. No decomp HF on exam. 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 No aortic dissection or intramural hematoma. TTE shows normal LVSF with EF of 65-70%; no reg WMA; nml RV sys fx; mild to mod TR; mild MR per cardiology patient can continue ASA and statin and is stable to pursue stress test as outpt.  Patient incidentally found to have a renal lesion on CT; MRI abdomen done noted with right renal medial upper pole cystic lesion with thin enhancing   septations (Bosniak IIF), 1.4 cm. Consider follow-up in 6 months. Additional bilateral renal cysts, some of which are hemorrhagic/proteinaceous. Indeterminate right renal lower pole  lesion noted on prior CT corresponds to a hemorrhagic/proteinaceous cyst. Patient instructed to follow up with PMD for recommended repeat monitoring. Patient stable for discharge per medicine and cards.     Weight - Discharge/Trend:  Weight in k.6 (25 @ 04:48)  Weight in k.2 (04-15-25 @ 04:44)    Documented EF: 65%-70%    Guideline Directed Medical Therapy Prescribed/Reason why not prescribed:  B-Blocker: n/a  ARNI/ACE-I/ARB:n/a  MRA:n/a  SGLT2 inhibitor: n/a    Appointment scheduled within 7 days?  [ X] YES [ ] NO    Active or Pending Issues Requiring Follow-up:  Dr. Ingrid Alves      Advanced Directives:   [ X] Full code  [ ] DNR  [ ] Hospice    Discharge Diagnoses:   HFpEF 65%  COPD  Cystic renal lesion

## 2025-04-17 ENCOUNTER — TRANSCRIPTION ENCOUNTER (OUTPATIENT)
Age: 89
End: 2025-04-17

## 2025-04-17 VITALS
RESPIRATION RATE: 18 BRPM | TEMPERATURE: 98 F | HEART RATE: 64 BPM | OXYGEN SATURATION: 97 % | SYSTOLIC BLOOD PRESSURE: 138 MMHG | WEIGHT: 113.98 LBS | DIASTOLIC BLOOD PRESSURE: 71 MMHG

## 2025-04-17 LAB
ALBUMIN SERPL ELPH-MCNC: 3.2 G/DL — LOW (ref 3.3–5)
ALP SERPL-CCNC: 65 U/L — SIGNIFICANT CHANGE UP (ref 40–120)
ALT FLD-CCNC: 47 U/L — HIGH (ref 10–45)
ANION GAP SERPL CALC-SCNC: 14 MMOL/L — SIGNIFICANT CHANGE UP (ref 5–17)
AST SERPL-CCNC: 44 U/L — HIGH (ref 10–40)
BILIRUB SERPL-MCNC: 0.3 MG/DL — SIGNIFICANT CHANGE UP (ref 0.2–1.2)
BUN SERPL-MCNC: 20 MG/DL — SIGNIFICANT CHANGE UP (ref 7–23)
CALCIUM SERPL-MCNC: 8.3 MG/DL — LOW (ref 8.4–10.5)
CHLORIDE SERPL-SCNC: 103 MMOL/L — SIGNIFICANT CHANGE UP (ref 96–108)
CO2 SERPL-SCNC: 20 MMOL/L — LOW (ref 22–31)
CREAT SERPL-MCNC: 0.74 MG/DL — SIGNIFICANT CHANGE UP (ref 0.5–1.3)
EGFR: 78 ML/MIN/1.73M2 — SIGNIFICANT CHANGE UP
EGFR: 78 ML/MIN/1.73M2 — SIGNIFICANT CHANGE UP
GLUCOSE SERPL-MCNC: 82 MG/DL — SIGNIFICANT CHANGE UP (ref 70–99)
HCT VFR BLD CALC: 32.9 % — LOW (ref 34.5–45)
HGB BLD-MCNC: 11 G/DL — LOW (ref 11.5–15.5)
MAGNESIUM SERPL-MCNC: 1.9 MG/DL — SIGNIFICANT CHANGE UP (ref 1.6–2.6)
MCHC RBC-ENTMCNC: 29.6 PG — SIGNIFICANT CHANGE UP (ref 27–34)
MCHC RBC-ENTMCNC: 33.4 G/DL — SIGNIFICANT CHANGE UP (ref 32–36)
MCV RBC AUTO: 88.7 FL — SIGNIFICANT CHANGE UP (ref 80–100)
NRBC BLD AUTO-RTO: 0 /100 WBCS — SIGNIFICANT CHANGE UP (ref 0–0)
PLATELET # BLD AUTO: 188 K/UL — SIGNIFICANT CHANGE UP (ref 150–400)
POTASSIUM SERPL-MCNC: 4.4 MMOL/L — SIGNIFICANT CHANGE UP (ref 3.5–5.3)
POTASSIUM SERPL-SCNC: 4.4 MMOL/L — SIGNIFICANT CHANGE UP (ref 3.5–5.3)
PROT SERPL-MCNC: 5.6 G/DL — LOW (ref 6–8.3)
RBC # BLD: 3.71 M/UL — LOW (ref 3.8–5.2)
RBC # FLD: 14.2 % — SIGNIFICANT CHANGE UP (ref 10.3–14.5)
SODIUM SERPL-SCNC: 137 MMOL/L — SIGNIFICANT CHANGE UP (ref 135–145)
WBC # BLD: 5.26 K/UL — SIGNIFICANT CHANGE UP (ref 3.8–10.5)
WBC # FLD AUTO: 5.26 K/UL — SIGNIFICANT CHANGE UP (ref 3.8–10.5)

## 2025-04-17 PROCEDURE — 83605 ASSAY OF LACTIC ACID: CPT

## 2025-04-17 PROCEDURE — 83735 ASSAY OF MAGNESIUM: CPT

## 2025-04-17 PROCEDURE — 99285 EMERGENCY DEPT VISIT HI MDM: CPT

## 2025-04-17 PROCEDURE — 97161 PT EVAL LOW COMPLEX 20 MIN: CPT

## 2025-04-17 PROCEDURE — 85018 HEMOGLOBIN: CPT

## 2025-04-17 PROCEDURE — 82947 ASSAY GLUCOSE BLOOD QUANT: CPT

## 2025-04-17 PROCEDURE — 74174 CTA ABD&PLVS W/CONTRAST: CPT | Mod: MC

## 2025-04-17 PROCEDURE — 80053 COMPREHEN METABOLIC PANEL: CPT

## 2025-04-17 PROCEDURE — 83880 ASSAY OF NATRIURETIC PEPTIDE: CPT

## 2025-04-17 PROCEDURE — 84484 ASSAY OF TROPONIN QUANT: CPT

## 2025-04-17 PROCEDURE — 85014 HEMATOCRIT: CPT

## 2025-04-17 PROCEDURE — 84295 ASSAY OF SERUM SODIUM: CPT

## 2025-04-17 PROCEDURE — 84132 ASSAY OF SERUM POTASSIUM: CPT

## 2025-04-17 PROCEDURE — 82330 ASSAY OF CALCIUM: CPT

## 2025-04-17 PROCEDURE — 93005 ELECTROCARDIOGRAM TRACING: CPT

## 2025-04-17 PROCEDURE — 82435 ASSAY OF BLOOD CHLORIDE: CPT

## 2025-04-17 PROCEDURE — 82803 BLOOD GASES ANY COMBINATION: CPT

## 2025-04-17 PROCEDURE — 85025 COMPLETE CBC W/AUTO DIFF WBC: CPT

## 2025-04-17 PROCEDURE — 74183 MRI ABD W/O CNTR FLWD CNTR: CPT | Mod: MC

## 2025-04-17 PROCEDURE — 85730 THROMBOPLASTIN TIME PARTIAL: CPT

## 2025-04-17 PROCEDURE — 85027 COMPLETE CBC AUTOMATED: CPT

## 2025-04-17 PROCEDURE — A9585: CPT

## 2025-04-17 PROCEDURE — 36415 COLL VENOUS BLD VENIPUNCTURE: CPT

## 2025-04-17 PROCEDURE — 71275 CT ANGIOGRAPHY CHEST: CPT | Mod: MC

## 2025-04-17 PROCEDURE — 93306 TTE W/DOPPLER COMPLETE: CPT

## 2025-04-17 PROCEDURE — 85610 PROTHROMBIN TIME: CPT

## 2025-04-17 RX ADMIN — AMLODIPINE BESYLATE 5 MILLIGRAM(S): 10 TABLET ORAL at 05:14

## 2025-04-17 NOTE — PROGRESS NOTE ADULT - SUBJECTIVE AND OBJECTIVE BOX
CARDIOLOGY FOLLOW UP - Dr. Lainez    Patient Name: MOR SOSA    DATE OF SERVICE: 04-16-25 @ 14:00    Patient seen and examined    CC no CP or SOB      REVIEW OF SYSTEMS:  CONSTITUTIONAL: No fever, weight loss, or fatigue  RESPIRATORY: No cough, wheezing, chills or hemoptysis; No Shortness of Breath  CARDIOVASCULAR: No chest pain, palpitations, passing out, dizziness  GASTROINTESTINAL: No abdominal or epigastric pain. No nausea, vomiting, or hematemesis; No diarrhea or constipation. No melena or hematochezia.      PHYSICAL EXAM:  T(C): 36.8 (04-16-25 @ 12:14), Max: 36.8 (04-16-25 @ 06:24)  HR: 68 (04-16-25 @ 12:14) (62 - 70)  BP: 132/74 (04-16-25 @ 12:14) (110/60 - 155/70)  RR: 18 (04-16-25 @ 12:14) (18 - 18)  SpO2: 99% (04-16-25 @ 12:14) (95% - 99%)  Wt(kg): --  I&O's Summary    16 Apr 2025 07:01  -  16 Apr 2025 14:00  --------------------------------------------------------  IN: 720 mL / OUT: 0 mL / NET: 720 mL        Appearance: Normal	  Cardiovascular: Normal S1 S2,RRR, No JVD, No murmurs  Respiratory: Lungs clear to auscultation b/l  Gastrointestinal:  Soft, Non-tender, + BS	  Extremities: Normal range of motion, No clubbing, cyanosis or edema      Home Medications:  amLODIPine 5 mg oral tablet: 1 tab(s) orally once a day (14 Apr 2025 16:09)  aspirin 81 mg oral delayed release tablet: 1 tab(s) orally once a day (14 Apr 2025 16:09)  atorvastatin 40 mg oral tablet: 1 tab(s) orally once a day (14 Apr 2025 16:09)  Bystolic 5 mg oral tablet: 1 tab(s) orally once a day (14 Apr 2025 16:09)      MEDICATIONS  (STANDING):  amLODIPine   Tablet 5 milliGRAM(s) Oral daily  aspirin enteric coated 81 milliGRAM(s) Oral daily  atorvastatin 40 milliGRAM(s) Oral at bedtime  influenza  Vaccine (HIGH DOSE) 0.5 milliLiter(s) IntraMuscular once      TELEMETRY: 	  SB/NSR with 4 beats WCT   ECG:  	  RADIOLOGY:   DIAGNOSTIC TESTING:  [x] Echocardiogram:  < from: TTE W or WO Ultrasound Enhancing Agent (04.16.25 @ 11:12) >  CONCLUSIONS:      1. Left ventricular cavity is normal in size. Left ventricular wall thickness is normal. Left ventricular systolic function is normal with an ejection fraction visually estimated at 65 to70 %. There are no regional wall motion abnormalities seen.   2. Normal right ventricular cavity size and normal right ventricular systolic function. Tricuspid annular plane systolic excursion (TAPSE) is 2.3 cm (normal >=1.7 cm). Tricuspid annular tissue Doppler S' is 12.8 cm/s (normal >10 cm/s).   3. No pericardial effusion seen.   4. Mild to moderate tricuspid regurgitation.   5. Compared to the transthoracic echocardiogram performed on 1/11/2024, there have been no significant interval changes.   6. Estimated pulmonary artery systolic pressure is 29 mmHg.   7. There is normal LV mass and normal geometry.    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 Consider follow-up in 6 months.    Additional bilateral renal cysts, some of which are   hemorrhagic/proteinaceous. Indeterminate right renal lower pole  lesion   noted on prior CT corresponds to a hemorrhagic/proteinaceous cyst.    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    < end of copied text >      LABS:	 	    Troponin T, High Sensitivity Result: 16 ng/L [0 - 51] (04-14 @ 12:44)  Troponin T, High Sensitivity Result: 19 ng/L [0 - 51] (04-14 @ 09:52)      04-16    135  |  100  |  13  ----------------------------<  163[H]  3.8   |  20[L]  |  0.72    Ca    8.3[L]      16 Apr 2025 10:09  Mg     1.8     04-16    TPro  6.6  /  Alb  3.8  /  TBili  0.5  /  DBili  x   /  AST  41[H]  /  ALT  46[H]  /  AlkPhos  77  04-16      Lipid Profile:   Hgb A1C:      BNP: Pro-Brain Natriuretic Peptide: 2440 pg/mL (04-14-25 @ 09:52)      Creatinine: 0.72 mg/dL (04-16-25 @ 10:09)  Creatinine: 0.67 mg/dL (04-14-25 @ 09:52)      Hemoglobin: 12.7 g/dL (04-14-25 @ 09:52)      
Patient is a 89y old  Female who presents with a chief complaint of SOB x 1 day (15 Apr 2025 12:49)      SUBJECTIVE / OVERNIGHT EVENTS:       T(C): 36.8 (04-16-25 @ 12:14), Max: 36.8 (04-16-25 @ 12:14)  HR: 68 (04-16-25 @ 12:14) (68 - 68)  BP: 132/74 (04-16-25 @ 12:14) (132/74 - 132/74)  RR: 18 (04-16-25 @ 12:14) (18 - 18)  SpO2: 99% (04-16-25 @ 12:14) (99% - 99%)      MEDICATIONS  (STANDING):  amLODIPine   Tablet 5 milliGRAM(s) Oral daily  aspirin enteric coated 81 milliGRAM(s) Oral daily  atorvastatin 40 milliGRAM(s) Oral at bedtime  influenza  Vaccine (HIGH DOSE) 0.5 milliLiter(s) IntraMuscular once    MEDICATIONS  (PRN):    PHYSICAL EXAM:  GENERAL: NAD, well-developed  HEAD:  Atraumatic, Normocephalic  EYES: EOMI, PERRLA, conjunctiva and sclera clear  NECK: Supple, No JVD  CHEST/LUNG: Clear to auscultation bilaterally; No wheeze  HEART: Regular rate and rhythm; No murmurs, rubs, or gallops  ABDOMEN: Soft, Nontender, Nondistended; Bowel sounds present  EXTREMITIES:  2+ Peripheral Pulses, No clubbing, cyanosis, or edema  PSYCH: AAOx3  NEUROLOGY: non-focal  SKIN: No rashes or lesions            LIVER FUNCTIONS - ( 16 Apr 2025 10:09 )  Alb: 3.8 g/dL / Pro: 6.6 g/dL / ALK PHOS: 77 U/L / ALT: 46 U/L / AST: 41 U/L / GGT: x             135|100|13<163  3.8|20|0.72  8.3,1.8,--  04-16 @ 10:09  H: x  Gluc: 97 mg/dL / Ketone: x  / Bili: x / Urobili: x   Blood: x / Protein: x / Nitrite: x   Leuk Esterase: x / RBC: x / WBC x   Sq Epi: x / Non Sq Epi: x / Bacteria: x        RADIOLOGY & ADDITIONAL TESTS:    Imaging Personally Reviewed:    Consultant(s) Notes Reviewed:      Care Discussed with Consultants/Other Providers:  
Patient is a 89y old  Female who presents with a chief complaint of SOB x 1 day (15 Apr 2025 12:49)      SUBJECTIVE / OVERNIGHT EVENTS:     MEDICATIONS  (STANDING):  amLODIPine   Tablet 5 milliGRAM(s) Oral daily  aspirin enteric coated 81 milliGRAM(s) Oral daily  atorvastatin 40 milliGRAM(s) Oral at bedtime  influenza  Vaccine (HIGH DOSE) 0.5 milliLiter(s) IntraMuscular once    MEDICATIONS  (PRN):      CAPILLARY BLOOD GLUCOSE        I&O's Summary    T(C): --  HR: --  BP: --  RR: --  SpO2: --    PHYSICAL EXAM:  GENERAL: NAD, well-developed  HEAD:  Atraumatic, Normocephalic  EYES: EOMI, PERRLA, conjunctiva and sclera clear  NECK: Supple, No JVD  CHEST/LUNG: Clear to auscultation bilaterally; No wheeze  HEART: Regular rate and rhythm; No murmurs, rubs, or gallops  ABDOMEN: Soft, Nontender, Nondistended; Bowel sounds present  EXTREMITIES:  2+ Peripheral Pulses, No clubbing, cyanosis, or edema  PSYCH: AAOx3  NEUROLOGY: non-focal  SKIN: No rashes or lesions    LABS:                        12.7   5.86  )-----------( 190      ( 14 Apr 2025 09:52 )             38.6     04-14    133[L]  |  98  |  14  ----------------------------<  97  4.0   |  21[L]  |  0.67    Ca    8.5      14 Apr 2025 09:52  Mg     1.8     04-14    TPro  6.6  /  Alb  3.8  /  TBili  0.5  /  DBili  x   /  AST  33  /  ALT  33  /  AlkPhos  76  04-14    PT/INR - ( 14 Apr 2025 09:52 )   PT: 11.6 sec;   INR: 1.02 ratio         PTT - ( 14 Apr 2025 09:52 )  PTT:29.9 sec      Urinalysis Basic - ( 14 Apr 2025 09:52 )    Color: x / Appearance: x / SG: x / pH: x  Gluc: 97 mg/dL / Ketone: x  / Bili: x / Urobili: x   Blood: x / Protein: x / Nitrite: x   Leuk Esterase: x / RBC: x / WBC x   Sq Epi: x / Non Sq Epi: x / Bacteria: x        RADIOLOGY & ADDITIONAL TESTS:    Imaging Personally Reviewed:    Consultant(s) Notes Reviewed:      Care Discussed with Consultants/Other Providers:  
Patient is a 89y old  Female who presents with a chief complaint of SOB x 1 day (16 Apr 2025 19:42)      SUBJECTIVE / OVERNIGHT EVENTS: No events     MEDICATIONS  (STANDING):  amLODIPine   Tablet 5 milliGRAM(s) Oral daily  aspirin enteric coated 81 milliGRAM(s) Oral daily  atorvastatin 40 milliGRAM(s) Oral at bedtime  bisacodyl 5 milliGRAM(s) Oral at bedtime  influenza  Vaccine (HIGH DOSE) 0.5 milliLiter(s) IntraMuscular once    MEDICATIONS  (PRN):      CAPILLARY BLOOD GLUCOSE        I&O's Summary    16 Apr 2025 07:01  -  17 Apr 2025 07:00  --------------------------------------------------------  IN: 820 mL / OUT: 0 mL / NET: 820 mL      T(C): 36.6 (04-17-25 @ 06:10), Max: 36.8 (04-17-25 @ 04:21)  HR: 64 (04-17-25 @ 06:10) (64 - 70)  BP: 138/71 (04-17-25 @ 06:10) (138/67 - 138/71)  RR: 18 (04-17-25 @ 06:10) (18 - 18)  SpO2: 97% (04-17-25 @ 06:10) (97% - 98%)    PHYSICAL EXAM:  GENERAL: NAD, well-developed  HEAD:  Atraumatic, Normocephalic  EYES: EOMI, PERRLA, conjunctiva and sclera clear  NECK: Supple, No JVD  CHEST/LUNG: Clear to auscultation bilaterally; No wheeze  HEART: Regular rate and rhythm; No murmurs, rubs, or gallops  ABDOMEN: Soft, Nontender, Nondistended; Bowel sounds present  EXTREMITIES:  2+ Peripheral Pulses, No clubbing, cyanosis, or edema  PSYCH: AAOx3  NEUROLOGY: non-focal  SKIN: No rashes or lesions    LABS:                        11.0   5.26  )-----------( 188      ( 17 Apr 2025 07:00 )             32.9     04-17    137  |  103  |  20  ----------------------------<  82  4.4   |  20[L]  |  0.74    Ca    8.3[L]      17 Apr 2025 07:00  Mg     1.9     04-17    TPro  5.6[L]  /  Alb  3.2[L]  /  TBili  0.3  /  DBili  x   /  AST  44[H]  /  ALT  47[H]  /  AlkPhos  65  04-17          Urinalysis Basic - ( 17 Apr 2025 07:00 )    Color: x / Appearance: x / SG: x / pH: x  Gluc: 82 mg/dL / Ketone: x  / Bili: x / Urobili: x   Blood: x / Protein: x / Nitrite: x   Leuk Esterase: x / RBC: x / WBC x   Sq Epi: x / Non Sq Epi: x / Bacteria: x        RADIOLOGY & ADDITIONAL TESTS:    Imaging Personally Reviewed:    Consultant(s) Notes Reviewed:      Care Discussed with Consultants/Other Providers:

## 2025-04-17 NOTE — PHYSICAL THERAPY INITIAL EVALUATION ADULT - ADDITIONAL COMMENTS
pt lives with daughter in private house, (+)flight of stairs. independent with mobility, pt states owns a straight cane for occasional outdoor ambulation, daughter present and available to assist with needs

## 2025-04-17 NOTE — DISCHARGE NOTE NURSING/CASE MANAGEMENT/SOCIAL WORK - FINANCIAL ASSISTANCE
[facility] provides services at a reduced cost to those who are determined to be eligible through [facility]’s financial assistance program. Information regarding [facility]’s financial assistance program can be found by going to [email or web address] or by calling [phone].

## 2025-04-17 NOTE — PROGRESS NOTE ADULT - ASSESSMENT
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 No aortic dissection or intramural   hematoma.   Renal lesion     Dyspnea on Exertion likely cardiac patient wheezing   Tele, CE   Followup Echo   Cards consulted   Ischemic Work up in progress    Renal lesion   MRI abdomen       COPD Nebs   AAA stable   HTN Ramipril     Hx Smoking   Smoking Cessation 
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 No aortic dissection or intramural   hematoma.   Renal lesion     Dyspnea on Exertion likely cardiac patient wheezing   Tele, CE   Followup Echo   Cards consulted   Ischemic Work up in progress    Renal lesion on MRI Cystic Lesuion   Out Patient follow up   D/C planning today     Time spent cocrdinating plan 43 minutes     COPD Nebs   AAA stable   HTN Ramipril     Hx Smoking   Smoking Cessation 
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 No aortic dissection or intramural   hematoma.   Renal lesion     Dyspnea on Exertion likely cardiac patient wheezing   Tele, CE   Followup Echo   Cards consulted   Ischemic Work up in progress    Renal lesion on MRI Cystic Corey   D/C planning today         COPD Nebs   AAA stable   HTN Ramipril     Hx Smoking   Smoking Cessation 
A/P  89-year-old female with pmhx  htn, hld, COPD, AAA (4.4 cm distal AAA on imaging from 09/2024) p/w Shortness of breath and chest heaviness since last night.      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 No aortic dissection or intramural hematoma.  -TTE shows normal LVSF with EF of 65-70%; no reg WMA; nml RV sys fx; mild to mod TR; mild MR  -cont ASA, statin    #Renal lesion   -CTA shows an indeterminate 1.5 cm right renal lesion  -MRI Abdomen noted  -med, renal f/u     COPD   -management per med     #HTN  -cont amlodipine     #HLD  -cont statin     dvt ppx    Follows with Dr. Alves cards

## 2025-04-17 NOTE — PHYSICAL THERAPY INITIAL EVALUATION ADULT - PERTINENT HX OF CURRENT PROBLEM, REHAB EVAL
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 No aortic dissection or intramural hematoma. MRI abd:Right renal medial upper pole cystic lesion with thin enhancing septations (Bosniak IIF), 1.4 c

## 2025-04-17 NOTE — DISCHARGE NOTE NURSING/CASE MANAGEMENT/SOCIAL WORK - PATIENT PORTAL LINK FT
You can access the FollowMyHealth Patient Portal offered by [facility] by registering at the following website: [email or web address]. By joining [facility]’s FollowMyHealth portal, you will also be able to view your health information using other applications (apps) compatible with our system.

## 2025-04-17 NOTE — DISCHARGE NOTE NURSING/CASE MANAGEMENT/SOCIAL WORK - NSDCFUADDAPPT_GEN_ALL_CORE_FT
APPTS ARE READY TO BE MADE: [X] YES    Best Family or Patient Contact (if needed):    Additional Information about above appointments (if needed):    1: Cardiology   2:   3:     Other comments or requests:

## 2025-04-23 ENCOUNTER — TRANSCRIPTION ENCOUNTER (OUTPATIENT)
Age: 89
End: 2025-04-23

## 2025-06-20 NOTE — ED PROVIDER NOTE - CONTACT TIME
Most recent blood work reviewed.  Will continue to monitor as appropriate  -Patient follows routinely with gastroenterology for colonoscopies given history of colon cancer.     28-Mar-2023 15:53

## 2025-07-10 ENCOUNTER — EMERGENCY (EMERGENCY)
Facility: HOSPITAL | Age: 89
LOS: 1 days | End: 2025-07-10
Attending: STUDENT IN AN ORGANIZED HEALTH CARE EDUCATION/TRAINING PROGRAM
Payer: MEDICARE

## 2025-07-10 VITALS
RESPIRATION RATE: 18 BRPM | WEIGHT: 104.94 LBS | SYSTOLIC BLOOD PRESSURE: 152 MMHG | TEMPERATURE: 98 F | DIASTOLIC BLOOD PRESSURE: 74 MMHG | HEART RATE: 68 BPM | OXYGEN SATURATION: 100 %

## 2025-07-10 DIAGNOSIS — Z98.89 OTHER SPECIFIED POSTPROCEDURAL STATES: Chronic | ICD-10-CM

## 2025-07-10 DIAGNOSIS — S62.102A FRACTURE OF UNSPECIFIED CARPAL BONE, LEFT WRIST, INITIAL ENCOUNTER FOR CLOSED FRACTURE: Chronic | ICD-10-CM

## 2025-07-10 DIAGNOSIS — Z98.49 CATARACT EXTRACTION STATUS, UNSPECIFIED EYE: Chronic | ICD-10-CM

## 2025-07-10 DIAGNOSIS — Z90.10 ACQUIRED ABSENCE OF UNSPECIFIED BREAST AND NIPPLE: Chronic | ICD-10-CM

## 2025-07-10 LAB
ALBUMIN SERPL ELPH-MCNC: 4 G/DL — SIGNIFICANT CHANGE UP (ref 3.5–5)
ALP SERPL-CCNC: 70 U/L — SIGNIFICANT CHANGE UP (ref 40–120)
ALT FLD-CCNC: 33 U/L DA — SIGNIFICANT CHANGE UP (ref 10–60)
ANION GAP SERPL CALC-SCNC: 3 MMOL/L — LOW (ref 5–17)
APPEARANCE UR: CLEAR — SIGNIFICANT CHANGE UP
AST SERPL-CCNC: 32 U/L — SIGNIFICANT CHANGE UP (ref 10–40)
BACTERIA # UR AUTO: ABNORMAL /HPF
BASOPHILS # BLD AUTO: 0.03 K/UL — SIGNIFICANT CHANGE UP (ref 0–0.2)
BASOPHILS NFR BLD AUTO: 0.3 % — SIGNIFICANT CHANGE UP (ref 0–2)
BILIRUB SERPL-MCNC: 0.4 MG/DL — SIGNIFICANT CHANGE UP (ref 0.2–1.2)
BILIRUB UR-MCNC: NEGATIVE — SIGNIFICANT CHANGE UP
BUN SERPL-MCNC: 26 MG/DL — HIGH (ref 7–18)
CALCIUM SERPL-MCNC: 9.1 MG/DL — SIGNIFICANT CHANGE UP (ref 8.4–10.5)
CHLORIDE SERPL-SCNC: 100 MMOL/L — SIGNIFICANT CHANGE UP (ref 96–108)
CO2 SERPL-SCNC: 29 MMOL/L — SIGNIFICANT CHANGE UP (ref 22–31)
COLOR SPEC: YELLOW — SIGNIFICANT CHANGE UP
CREAT SERPL-MCNC: 0.79 MG/DL — SIGNIFICANT CHANGE UP (ref 0.5–1.3)
DIFF PNL FLD: NEGATIVE — SIGNIFICANT CHANGE UP
EGFR: 72 ML/MIN/1.73M2 — SIGNIFICANT CHANGE UP
EGFR: 72 ML/MIN/1.73M2 — SIGNIFICANT CHANGE UP
EOSINOPHIL # BLD AUTO: 0.05 K/UL — SIGNIFICANT CHANGE UP (ref 0–0.5)
EOSINOPHIL NFR BLD AUTO: 0.5 % — SIGNIFICANT CHANGE UP (ref 0–6)
EPI CELLS # UR: PRESENT
FLUAV AG NPH QL: SIGNIFICANT CHANGE UP
FLUBV AG NPH QL: SIGNIFICANT CHANGE UP
GLUCOSE SERPL-MCNC: 76 MG/DL — SIGNIFICANT CHANGE UP (ref 70–99)
GLUCOSE UR QL: NEGATIVE MG/DL — SIGNIFICANT CHANGE UP
HCT VFR BLD CALC: 40.6 % — SIGNIFICANT CHANGE UP (ref 34.5–45)
HGB BLD-MCNC: 13.7 G/DL — SIGNIFICANT CHANGE UP (ref 11.5–15.5)
IMM GRANULOCYTES NFR BLD AUTO: 0.4 % — SIGNIFICANT CHANGE UP (ref 0–0.9)
KETONES UR QL: NEGATIVE MG/DL — SIGNIFICANT CHANGE UP
LEUKOCYTE ESTERASE UR-ACNC: ABNORMAL
LYMPHOCYTES # BLD AUTO: 1.87 K/UL — SIGNIFICANT CHANGE UP (ref 1–3.3)
LYMPHOCYTES # BLD AUTO: 20 % — SIGNIFICANT CHANGE UP (ref 13–44)
MAGNESIUM SERPL-MCNC: 2.1 MG/DL — SIGNIFICANT CHANGE UP (ref 1.6–2.6)
MCHC RBC-ENTMCNC: 30.3 PG — SIGNIFICANT CHANGE UP (ref 27–34)
MCHC RBC-ENTMCNC: 33.7 G/DL — SIGNIFICANT CHANGE UP (ref 32–36)
MCV RBC AUTO: 89.8 FL — SIGNIFICANT CHANGE UP (ref 80–100)
MONOCYTES # BLD AUTO: 0.64 K/UL — SIGNIFICANT CHANGE UP (ref 0–0.9)
MONOCYTES NFR BLD AUTO: 6.8 % — SIGNIFICANT CHANGE UP (ref 2–14)
NEUTROPHILS # BLD AUTO: 6.72 K/UL — SIGNIFICANT CHANGE UP (ref 1.8–7.4)
NEUTROPHILS NFR BLD AUTO: 72 % — SIGNIFICANT CHANGE UP (ref 43–77)
NITRITE UR-MCNC: NEGATIVE — SIGNIFICANT CHANGE UP
NRBC BLD AUTO-RTO: 0 /100 WBCS — SIGNIFICANT CHANGE UP (ref 0–0)
PH UR: 6 — SIGNIFICANT CHANGE UP (ref 5–8)
PHOSPHATE SERPL-MCNC: 4.6 MG/DL — HIGH (ref 2.5–4.5)
PLATELET # BLD AUTO: 207 K/UL — SIGNIFICANT CHANGE UP (ref 150–400)
POTASSIUM SERPL-MCNC: 5.3 MMOL/L — SIGNIFICANT CHANGE UP (ref 3.5–5.3)
POTASSIUM SERPL-SCNC: 5.3 MMOL/L — SIGNIFICANT CHANGE UP (ref 3.5–5.3)
PROT SERPL-MCNC: 7.6 G/DL — SIGNIFICANT CHANGE UP (ref 6–8.3)
PROT UR-MCNC: NEGATIVE MG/DL — SIGNIFICANT CHANGE UP
RBC # BLD: 4.52 M/UL — SIGNIFICANT CHANGE UP (ref 3.8–5.2)
RBC # FLD: 15.1 % — HIGH (ref 10.3–14.5)
RBC CASTS # UR COMP ASSIST: 4 /HPF — SIGNIFICANT CHANGE UP (ref 0–4)
RSV RNA NPH QL NAA+NON-PROBE: SIGNIFICANT CHANGE UP
SARS-COV-2 RNA SPEC QL NAA+PROBE: SIGNIFICANT CHANGE UP
SODIUM SERPL-SCNC: 132 MMOL/L — LOW (ref 135–145)
SOURCE RESPIRATORY: SIGNIFICANT CHANGE UP
SP GR SPEC: 1.01 — SIGNIFICANT CHANGE UP (ref 1–1.03)
TROPONIN I, HIGH SENSITIVITY RESULT: 18.7 NG/L — SIGNIFICANT CHANGE UP
UROBILINOGEN FLD QL: 0.2 MG/DL — SIGNIFICANT CHANGE UP (ref 0.2–1)
WBC # BLD: 9.35 K/UL — SIGNIFICANT CHANGE UP (ref 3.8–10.5)
WBC # FLD AUTO: 9.35 K/UL — SIGNIFICANT CHANGE UP (ref 3.8–10.5)
WBC UR QL: 15 /HPF — HIGH (ref 0–5)

## 2025-07-10 PROCEDURE — 99285 EMERGENCY DEPT VISIT HI MDM: CPT | Mod: 25

## 2025-07-10 PROCEDURE — 87637 SARSCOV2&INF A&B&RSV AMP PRB: CPT

## 2025-07-10 PROCEDURE — 81001 URINALYSIS AUTO W/SCOPE: CPT

## 2025-07-10 PROCEDURE — 82962 GLUCOSE BLOOD TEST: CPT

## 2025-07-10 PROCEDURE — 99285 EMERGENCY DEPT VISIT HI MDM: CPT

## 2025-07-10 PROCEDURE — 71045 X-RAY EXAM CHEST 1 VIEW: CPT | Mod: 26

## 2025-07-10 PROCEDURE — 93010 ELECTROCARDIOGRAM REPORT: CPT

## 2025-07-10 PROCEDURE — 71045 X-RAY EXAM CHEST 1 VIEW: CPT

## 2025-07-10 PROCEDURE — 83735 ASSAY OF MAGNESIUM: CPT

## 2025-07-10 PROCEDURE — 84100 ASSAY OF PHOSPHORUS: CPT

## 2025-07-10 PROCEDURE — 83880 ASSAY OF NATRIURETIC PEPTIDE: CPT

## 2025-07-10 PROCEDURE — 80053 COMPREHEN METABOLIC PANEL: CPT

## 2025-07-10 PROCEDURE — 36415 COLL VENOUS BLD VENIPUNCTURE: CPT

## 2025-07-10 PROCEDURE — 85025 COMPLETE CBC W/AUTO DIFF WBC: CPT

## 2025-07-10 PROCEDURE — 87086 URINE CULTURE/COLONY COUNT: CPT

## 2025-07-10 PROCEDURE — 84484 ASSAY OF TROPONIN QUANT: CPT

## 2025-07-10 PROCEDURE — 93005 ELECTROCARDIOGRAM TRACING: CPT

## 2025-07-10 RX ADMIN — Medication 500 MILLILITER(S): at 18:21

## 2025-07-10 NOTE — ED PROVIDER NOTE - PATIENT PORTAL LINK FT
You can access the FollowMyHealth Patient Portal offered by [facility] by registering at the following website: [email or web address]. By joining [employer]’s FollowMyHealth portal, you will also be able to view your health information using other applications (apps) compatible with our system.

## 2025-07-10 NOTE — ED ADULT TRIAGE NOTE - CHIEF COMPLAINT QUOTE
Pt c/o dizziness, SOB and generalized weakness starting this morning. Pt reports similar symptoms in the past. Denies fevers. .

## 2025-07-10 NOTE — ED PROVIDER NOTE - NSFOLLOWUPINSTRUCTIONS_ED_ALL_ED_FT
Please ensure that you stay adequately hydrated  Follow up with your primary care doctor  Seek Medical attention or return to the emergency department for any new or worsening symptoms.

## 2025-07-10 NOTE — ED PROVIDER NOTE - OBJECTIVE STATEMENT
89 y/o F PMH of HTN, HLD, COPD, AAA p/w dizziness. 89 y/o F PMH of HTN, HLD, COPD/emphysema (active smoker), AAA p/w dizziness.    Patient states she felt very light-headed since this morning. NO associated chest, back pain, abdominal pain, n/v/d, f/c or dysuria. Patient has air conditioning. Has stable AAA- last imaging in April at [facility]. Denies vertigo, headache or recent fall. NO sob contrary to triage note.

## 2025-07-10 NOTE — ED ADULT NURSE NOTE - NSFALLUNIVINTERV_ED_ALL_ED
Bed/Stretcher in lowest position, wheels locked, appropriate side rails in place/Call bell, personal items and telephone in reach/Instruct patient to call for assistance before getting out of bed/chair/stretcher/Non-slip footwear applied when patient is off stretcher/[place] to call system/Physically safe environment - no spills, clutter or unnecessary equipment/Purposeful proactive rounding/Room/bathroom lighting operational, light cord in reach

## 2025-07-10 NOTE — ED PROVIDER NOTE - NSDCPRINTRESULTS_ED_ALL_ED
History and Physical      Name:  Bridger Moore /Age/Sex: 1961  (63 y.o. female)   MRN & CSN:  [id number] & [id number] Admission Date/Time: 2021  4:21 AM   Location:  ED18/ED-18 PCP: Telma Mejias, [facility] Day: 1        Admitting Physician: Dr. Flex Caldwell and Plan:   Bridger Moore is a 63 y.o. female who presents with  Shortness of Breath (Hx of COPD)     Acute on chronic respiratory failure COPD with acute exacerbation. VBG pH:[7.27]. Syptomatic with new/increased oxygen requirement and auditory wheezing. Admit to Med/Surg   Pulmonary hygiene   Bronchodilators   IV solumedrol 40 mg BID   Pending sputum culture   IV antibiotics azithromycin   Follows with Dr Jordin Magdaleno for pulmonology     Essential hypertension- continue home antihypertensive regimen- Monitor BP trends. Patient case discussed with ED provider    Diet No diet orders on file   DVT Prophylaxis [] Lovenox, []  Heparin, [x] SCDs, [] Ambulation  [] Long term AC. GI Prophylaxis [] PPI,  [] H2 Blocker,  [] Carafate,  [] Diet/Tube Feeds   Code Status Full     Disposition Admit to inpatient. Patient plans to return home upon discharge     -Patient assessment and plan discussed and reviewed with admitting physician: Kimmy Wilkerson MD.     History of Present Illness:     Chief Complaint: Shortness of Breath (Hx of COPD)      Bridger Moore is a 63 y.o. female who presents with worsening shortness of breath. Reports onset of symptoms approximate 3 days ago and worsening. States presented to the emergency room after inability to \"catch her breath\" no relief from home bronchodilators. She called EMS and upon arrival was noted to have an oxygen saturation in the 60s. She was started on supplemental oxygen and placed on BiPAP in the emergency room and reports significant improvement. Denies productive cough or subjective fever symptoms.      Denies HA, vision changes, fever, chills, sweats, CP, abdominal pain, diarrhea, constipation, or dysuria. Ten point ROS: reviewed negative, unless as noted in above HPI. Objective:   No intake or output data in the 24 hours ending 21 0839     Vitals:   Vitals:    21 0704 21 0740 21 0753 21 0820   BP:    129/77   Pulse: 98   93   Resp: 17 11 13 18   Temp:       SpO2: 93% 94%  98%   Weight:       Height:           Physical Exam: 21     GEN -Awake distressed appearing female, sitting upright in bed , obese body habitus. Appears given age. EYES -No scleral erythema, discharge, or conjunctivitis. HENT -MM are moist. Oral pharynx without exudates, no evidence of thrush. NECK -Supple, no apparent thyromegaly or masses. RESP -diminished with expiratory wheezing symmetric chest movement while on BiPAP. C/V -S1/S2 auscultated. RRR without appreciable M/R/G. No JVD or carotid bruits. Peripheral pulses equal bilaterally and palpable. Cap refill <3 sec. No peripheral edema. GI -Abdomen is soft non distended and without significant TTP. + BS. No masses or guarding. Rectal exam deferred. No HSM   -No CVA/ flank tenderness. Fraser catheter is not present. LYMPH-No palpable cervical lymphadenopathy and no hepatosplenomegaly. No petechiae or ecchymoses. MS -No gross joint deformities. SKIN -Normal coloration, warm, dry. NEURO-Cranial nerves appear grossly intact, normal speech, no lateralizing weakness. PSYC-Awake, alert, oriented x 4- person, place, time, situation,  Appropriate affect.     Past Medical History:      Past Medical History:   Diagnosis Date    ADHD     [facility]) [facility]     COVID-19     Drug addiction in remission ([facility]     recovering addict    Hep C w/o coma, chronic ([facility]     Pacemaker     Sleep apnea     TIA (transient ischemic attack)     per patient \"several mini strokes\"       Past Surgical  History:    has a past surgical history that includes  section (); Cholecystectomy (); and Pacemaker insertion (). Social History:    FAM HX: Reviewed  family history includes Alcohol Abuse in her father; Cancer in her mother; Coronary Art Dis in her brother; Lupus in her brother; Other in her sister; Thyroid Cancer in her mother. Soc HX:   Social History     Socioeconomic History    Marital status: Single     Spouse name: Not on file    Number of children: Not on file    Years of education: Not on file    Highest education level: Not on file   Occupational History    Not on file   Tobacco Use    Smoking status: Former Smoker     Packs/day: 1.00     Years: 40.00     Pack years: 40.00     Quit date: 2016     Years since quittin.8    Smokeless tobacco: Never Used   Substance and Sexual Activity    Alcohol use: Not Currently    Drug use: Not Currently     Comment: heroine    Sexual activity: Not on file   Other Topics Concern    Not on file   Social History Narrative    Not on file     Social Determinants of Health     Financial Resource Strain:     Difficulty of Paying Living Expenses:    Food Insecurity:     Worried About Running Out of Food in the Last Year:     [address] in the Last Year:    Transportation Needs:     Lack of Transportation (Medical):  Lack of Transportation (Non-Medical):    Physical Activity:     Days of Exercise per Week:     Minutes of Exercise per Session:    Stress:     Feeling of Stress :    Social Connections:     Frequency of Communication with Friends and Family:     Frequency of Social Gatherings with Friends and Family:     Attends [employer] Services:     Active Member of Clubs or Organizations:     Attends Club or Organization Meetings:     Marital Status:    Intimate Partner Violence:     Fear of Current or Ex-Partner:     Emotionally Abused:     Physically Abused:     Sexually Abused:      TOBACCO:   reports that she quit smoking about 4 years ago.  She has a 40.00 pack-year smoking over the heart. Mild pulmonary vascular congestion. Nodular infiltrates within the left lower lobe. Correlate for pneumonia and consider CT for further evaluation.        Electronically signed by KELLY Villarreal CNP on 7/9/2021 at 8:39 AM Patient requests all Lab, Cardiology, and Radiology Results on their Discharge Instructions

## 2025-07-10 NOTE — ED PROVIDER NOTE - CLINICAL SUMMARY MEDICAL DECISION MAKING FREE TEXT BOX
89 y/o F PMH of HTN, HLD, COPD/emphysema (active smoker), AAA p/w dizziness.  Well-appearing; nonfocal neurological exam  r/o metabolic or infectious precipitant   possible orthostasis  Gentle IVF  Screening labs, UA  EKG NSR  CXR with stable fibrotic changes, previous CT chest reviewed. 89 y/o F PMH of HTN, HLD, COPD/emphysema (active smoker), AAA p/w dizziness.  Well-appearing; nonfocal neurological exam  r/o metabolic or infectious precipitant   possible orthostasis  Gentle IVF  Screening labs, UA  EKG NSR  CXR with stable fibrotic changes, previous CT chest reviewed.  Echo reviewed '25-->preserved LV function.

## 2025-07-12 LAB
CULTURE RESULTS: SIGNIFICANT CHANGE UP
SPECIMEN SOURCE: SIGNIFICANT CHANGE UP